# Patient Record
Sex: FEMALE | Race: WHITE | Employment: STUDENT | URBAN - METROPOLITAN AREA
[De-identification: names, ages, dates, MRNs, and addresses within clinical notes are randomized per-mention and may not be internally consistent; named-entity substitution may affect disease eponyms.]

---
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
## 2022-10-18 ENCOUNTER — APPOINTMENT (OUTPATIENT)
Dept: CT IMAGING | Age: 22
DRG: 815 | End: 2022-10-18
Attending: EMERGENCY MEDICINE
Payer: COMMERCIAL

## 2022-10-18 ENCOUNTER — HOSPITAL ENCOUNTER (INPATIENT)
Age: 22
LOS: 2 days | Discharge: HOME | DRG: 815 | End: 2022-10-20
Attending: STUDENT IN AN ORGANIZED HEALTH CARE EDUCATION/TRAINING PROGRAM | Admitting: HOSPITALIST
Payer: COMMERCIAL

## 2022-10-18 DIAGNOSIS — D73.5 SPLENIC INFARCT: Primary | ICD-10-CM

## 2022-10-18 DIAGNOSIS — R10.12 ABDOMINAL PAIN, LUQ (LEFT UPPER QUADRANT): ICD-10-CM

## 2022-10-18 DIAGNOSIS — R79.89 ELEVATED LFTS: ICD-10-CM

## 2022-10-18 DIAGNOSIS — R61 NIGHT SWEATS: ICD-10-CM

## 2022-10-18 DIAGNOSIS — D73.5 SPLENIC INFARCTION: ICD-10-CM

## 2022-10-18 LAB
ALBUMIN SERPL-MCNC: 3.2 G/DL (ref 3.5–5)
ALBUMIN/GLOB SERPL: 0.7 {RATIO} (ref 1.1–2.2)
ALP SERPL-CCNC: 140 U/L (ref 45–117)
ALT SERPL-CCNC: 100 U/L (ref 12–78)
ANION GAP SERPL CALC-SCNC: 8 MMOL/L (ref 5–15)
APPEARANCE UR: CLEAR
AST SERPL-CCNC: 109 U/L (ref 15–37)
BACTERIA URNS QL MICRO: NEGATIVE /HPF
BASOPHILS # BLD: 0.1 K/UL (ref 0–0.1)
BASOPHILS NFR BLD: 1 % (ref 0–1)
BILIRUB SERPL-MCNC: 1.5 MG/DL (ref 0.2–1)
BILIRUB UR QL CFM: NEGATIVE
BUN SERPL-MCNC: 4 MG/DL (ref 6–20)
BUN/CREAT SERPL: 5 (ref 12–20)
CALCIUM SERPL-MCNC: 9.1 MG/DL (ref 8.5–10.1)
CHLORIDE SERPL-SCNC: 105 MMOL/L (ref 97–108)
CO2 SERPL-SCNC: 23 MMOL/L (ref 21–32)
COLOR UR: ABNORMAL
COMMENT, HOLDF: NORMAL
COVID-19 RAPID TEST, COVR: NOT DETECTED
CREAT SERPL-MCNC: 0.78 MG/DL (ref 0.55–1.02)
DIFFERENTIAL METHOD BLD: ABNORMAL
EOSINOPHIL # BLD: 0 K/UL (ref 0–0.4)
EOSINOPHIL NFR BLD: 0 % (ref 0–7)
EPITH CASTS URNS QL MICRO: ABNORMAL /LPF
ERYTHROCYTE [DISTWIDTH] IN BLOOD BY AUTOMATED COUNT: 13.6 % (ref 11.5–14.5)
GLOBULIN SER CALC-MCNC: 4.6 G/DL (ref 2–4)
GLUCOSE SERPL-MCNC: 118 MG/DL (ref 65–100)
GLUCOSE UR STRIP.AUTO-MCNC: NEGATIVE MG/DL
HCG UR QL: NEGATIVE
HCT VFR BLD AUTO: 34.6 % (ref 35–47)
HGB BLD-MCNC: 12.6 G/DL (ref 11.5–16)
HGB UR QL STRIP: NEGATIVE
HYALINE CASTS URNS QL MICRO: ABNORMAL /LPF (ref 0–5)
IMM GRANULOCYTES # BLD AUTO: 0.1 K/UL (ref 0–0.04)
IMM GRANULOCYTES NFR BLD AUTO: 1 % (ref 0–0.5)
KETONES UR QL STRIP.AUTO: NEGATIVE MG/DL
LEUKOCYTE ESTERASE UR QL STRIP.AUTO: ABNORMAL
LIPASE SERPL-CCNC: 78 U/L (ref 73–393)
LYMPHOCYTES # BLD: 5 K/UL (ref 0.8–3.5)
LYMPHOCYTES NFR BLD: 54 % (ref 12–49)
MCH RBC QN AUTO: 35 PG (ref 26–34)
MCHC RBC AUTO-ENTMCNC: 36.4 G/DL (ref 30–36.5)
MCV RBC AUTO: 96.1 FL (ref 80–99)
MONOCYTES # BLD: 0.6 K/UL (ref 0–1)
MONOCYTES NFR BLD: 6 % (ref 5–13)
NEUTS SEG # BLD: 3.5 K/UL (ref 1.8–8)
NEUTS SEG NFR BLD: 38 % (ref 32–75)
NITRITE UR QL STRIP.AUTO: POSITIVE
NRBC # BLD: 0 K/UL (ref 0–0.01)
NRBC BLD-RTO: 0 PER 100 WBC
PH UR STRIP: 6 [PH] (ref 5–8)
PLATELET # BLD AUTO: 234 K/UL (ref 150–400)
PMV BLD AUTO: 11.9 FL (ref 8.9–12.9)
POTASSIUM SERPL-SCNC: 3.7 MMOL/L (ref 3.5–5.1)
PROT SERPL-MCNC: 7.8 G/DL (ref 6.4–8.2)
PROT UR STRIP-MCNC: NEGATIVE MG/DL
RBC # BLD AUTO: 3.6 M/UL (ref 3.8–5.2)
RBC #/AREA URNS HPF: ABNORMAL /HPF (ref 0–5)
RBC MORPH BLD: ABNORMAL
SAMPLES BEING HELD,HOLD: NORMAL
SODIUM SERPL-SCNC: 136 MMOL/L (ref 136–145)
SOURCE, COVRS: NORMAL
SP GR UR REFRACTOMETRY: 1.02 (ref 1–1.03)
UR CULT HOLD, URHOLD: NORMAL
UROBILINOGEN UR QL STRIP.AUTO: 1 EU/DL (ref 0.2–1)
WBC # BLD AUTO: 9.3 K/UL (ref 3.6–11)
WBC MORPH BLD: ABNORMAL
WBC URNS QL MICRO: ABNORMAL /HPF (ref 0–4)

## 2022-10-18 PROCEDURE — 85613 RUSSELL VIPER VENOM DILUTED: CPT

## 2022-10-18 PROCEDURE — 99285 EMERGENCY DEPT VISIT HI MDM: CPT

## 2022-10-18 PROCEDURE — 74177 CT ABD & PELVIS W/CONTRAST: CPT

## 2022-10-18 PROCEDURE — 86777 TOXOPLASMA ANTIBODY: CPT

## 2022-10-18 PROCEDURE — 74011250637 HC RX REV CODE- 250/637: Performed by: HOSPITALIST

## 2022-10-18 PROCEDURE — 74011250636 HC RX REV CODE- 250/636: Performed by: INTERNAL MEDICINE

## 2022-10-18 PROCEDURE — 96374 THER/PROPH/DIAG INJ IV PUSH: CPT

## 2022-10-18 PROCEDURE — 85025 COMPLETE CBC W/AUTO DIFF WBC: CPT

## 2022-10-18 PROCEDURE — 83690 ASSAY OF LIPASE: CPT

## 2022-10-18 PROCEDURE — 74011250636 HC RX REV CODE- 250/636: Performed by: HOSPITALIST

## 2022-10-18 PROCEDURE — 65270000029 HC RM PRIVATE

## 2022-10-18 PROCEDURE — 81001 URINALYSIS AUTO W/SCOPE: CPT

## 2022-10-18 PROCEDURE — 80053 COMPREHEN METABOLIC PANEL: CPT

## 2022-10-18 PROCEDURE — 81241 F5 GENE: CPT

## 2022-10-18 PROCEDURE — 96375 TX/PRO/DX INJ NEW DRUG ADDON: CPT

## 2022-10-18 PROCEDURE — 74011250637 HC RX REV CODE- 250/637: Performed by: INTERNAL MEDICINE

## 2022-10-18 PROCEDURE — 81025 URINE PREGNANCY TEST: CPT

## 2022-10-18 PROCEDURE — 87040 BLOOD CULTURE FOR BACTERIA: CPT

## 2022-10-18 PROCEDURE — 74011000250 HC RX REV CODE- 250: Performed by: HOSPITALIST

## 2022-10-18 PROCEDURE — 87635 SARS-COV-2 COVID-19 AMP PRB: CPT

## 2022-10-18 PROCEDURE — 36415 COLL VENOUS BLD VENIPUNCTURE: CPT

## 2022-10-18 PROCEDURE — 74011000636 HC RX REV CODE- 636: Performed by: RADIOLOGY

## 2022-10-18 PROCEDURE — 74011250636 HC RX REV CODE- 250/636: Performed by: EMERGENCY MEDICINE

## 2022-10-18 RX ORDER — POLYETHYLENE GLYCOL 3350 17 G/17G
17 POWDER, FOR SOLUTION ORAL DAILY PRN
Status: DISCONTINUED | OUTPATIENT
Start: 2022-10-18 | End: 2022-10-20 | Stop reason: HOSPADM

## 2022-10-18 RX ORDER — ACETAMINOPHEN 325 MG/1
650 TABLET ORAL
Status: DISCONTINUED | OUTPATIENT
Start: 2022-10-18 | End: 2022-10-20 | Stop reason: HOSPADM

## 2022-10-18 RX ORDER — ENOXAPARIN SODIUM 100 MG/ML
90 INJECTION SUBCUTANEOUS EVERY 12 HOURS
Status: DISCONTINUED | OUTPATIENT
Start: 2022-10-18 | End: 2022-10-20 | Stop reason: HOSPADM

## 2022-10-18 RX ORDER — SODIUM CHLORIDE 0.9 % (FLUSH) 0.9 %
5-40 SYRINGE (ML) INJECTION EVERY 8 HOURS
Status: DISCONTINUED | OUTPATIENT
Start: 2022-10-18 | End: 2022-10-20 | Stop reason: HOSPADM

## 2022-10-18 RX ORDER — ONDANSETRON 2 MG/ML
4 INJECTION INTRAMUSCULAR; INTRAVENOUS
Status: DISCONTINUED | OUTPATIENT
Start: 2022-10-18 | End: 2022-10-20 | Stop reason: HOSPADM

## 2022-10-18 RX ORDER — ACETAMINOPHEN 650 MG/1
650 SUPPOSITORY RECTAL
Status: DISCONTINUED | OUTPATIENT
Start: 2022-10-18 | End: 2022-10-20 | Stop reason: HOSPADM

## 2022-10-18 RX ORDER — MORPHINE SULFATE 2 MG/ML
2 INJECTION, SOLUTION INTRAMUSCULAR; INTRAVENOUS ONCE
Status: COMPLETED | OUTPATIENT
Start: 2022-10-18 | End: 2022-10-18

## 2022-10-18 RX ORDER — KETOROLAC TROMETHAMINE 30 MG/ML
30 INJECTION, SOLUTION INTRAMUSCULAR; INTRAVENOUS
Status: DISCONTINUED | OUTPATIENT
Start: 2022-10-18 | End: 2022-10-20 | Stop reason: HOSPADM

## 2022-10-18 RX ORDER — TRAMADOL HYDROCHLORIDE 50 MG/1
50 TABLET ORAL
Status: DISCONTINUED | OUTPATIENT
Start: 2022-10-18 | End: 2022-10-20 | Stop reason: HOSPADM

## 2022-10-18 RX ORDER — MORPHINE SULFATE 2 MG/ML
2 INJECTION, SOLUTION INTRAMUSCULAR; INTRAVENOUS
Status: COMPLETED | OUTPATIENT
Start: 2022-10-18 | End: 2022-10-18

## 2022-10-18 RX ORDER — SODIUM CHLORIDE 0.9 % (FLUSH) 0.9 %
5-40 SYRINGE (ML) INJECTION AS NEEDED
Status: DISCONTINUED | OUTPATIENT
Start: 2022-10-18 | End: 2022-10-20 | Stop reason: HOSPADM

## 2022-10-18 RX ORDER — ONDANSETRON 2 MG/ML
4 INJECTION INTRAMUSCULAR; INTRAVENOUS
Status: COMPLETED | OUTPATIENT
Start: 2022-10-18 | End: 2022-10-18

## 2022-10-18 RX ORDER — DIPHENHYDRAMINE HCL 25 MG
25 CAPSULE ORAL
Status: DISCONTINUED | OUTPATIENT
Start: 2022-10-18 | End: 2022-10-20 | Stop reason: HOSPADM

## 2022-10-18 RX ORDER — ONDANSETRON 4 MG/1
4 TABLET, ORALLY DISINTEGRATING ORAL
Status: DISCONTINUED | OUTPATIENT
Start: 2022-10-18 | End: 2022-10-20 | Stop reason: HOSPADM

## 2022-10-18 RX ADMIN — CEFEPIME 2 G: 2 INJECTION, POWDER, FOR SOLUTION INTRAVENOUS at 15:57

## 2022-10-18 RX ADMIN — ENOXAPARIN SODIUM 90 MG: 100 INJECTION SUBCUTANEOUS at 17:41

## 2022-10-18 RX ADMIN — ONDANSETRON 4 MG: 2 INJECTION INTRAMUSCULAR; INTRAVENOUS at 12:37

## 2022-10-18 RX ADMIN — IOPAMIDOL 100 ML: 755 INJECTION, SOLUTION INTRAVENOUS at 13:21

## 2022-10-18 RX ADMIN — MORPHINE SULFATE 2 MG: 2 INJECTION, SOLUTION INTRAMUSCULAR; INTRAVENOUS at 21:04

## 2022-10-18 RX ADMIN — SODIUM CHLORIDE, PRESERVATIVE FREE 10 ML: 5 INJECTION INTRAVENOUS at 17:43

## 2022-10-18 RX ADMIN — SODIUM CHLORIDE, PRESERVATIVE FREE 10 ML: 5 INJECTION INTRAVENOUS at 21:05

## 2022-10-18 RX ADMIN — DIPHENHYDRAMINE HYDROCHLORIDE 25 MG: 25 CAPSULE ORAL at 19:27

## 2022-10-18 RX ADMIN — TRAMADOL HYDROCHLORIDE 50 MG: 50 TABLET, COATED ORAL at 22:54

## 2022-10-18 RX ADMIN — MORPHINE SULFATE 2 MG: 2 INJECTION, SOLUTION INTRAMUSCULAR; INTRAVENOUS at 12:37

## 2022-10-18 RX ADMIN — VANCOMYCIN HYDROCHLORIDE 2250 MG: 10 INJECTION, POWDER, LYOPHILIZED, FOR SOLUTION INTRAVENOUS at 17:42

## 2022-10-18 RX ADMIN — KETOROLAC TROMETHAMINE 30 MG: 30 INJECTION, SOLUTION INTRAMUSCULAR; INTRAVENOUS at 15:58

## 2022-10-18 RX ADMIN — TRAMADOL HYDROCHLORIDE 50 MG: 50 TABLET, COATED ORAL at 15:57

## 2022-10-18 NOTE — ED NOTES
Has a final transfer review been performed?  Yes    Reason for Admission: spleenic infaract  Patient comes from: ER  Mental Status: Alert and oriented  ADL:self care  Ambulation:no difficulty  Pertinent Info/Safety Concerns: none  COVID Status: Negative  MEWS Score: 0     Vitals:    10/18/22 1039 10/18/22 1632   BP: (!) 137/93    Pulse: 97    Resp: 16    Temp: 97.2 °F (36.2 °C)    SpO2: 100%    Weight:  94.8 kg (209 lb)   Height:  5' 11\" (1.803 m)     Lines:   Peripheral IV 10/18/22 Left Antecubital (Active)   Site Assessment Clean, dry, & intact 10/18/22 1052   Phlebitis Assessment 0 10/18/22 1052   Infiltration Assessment 0 10/18/22 1052   Dressing Status Clean, dry, & intact 10/18/22 1052   Hub Color/Line Status Pink 10/18/22 1052   Action Taken Blood drawn 10/18/22 1052      Transport mode:Wheelchair    Kt Hayden  being transferred to [facility](unit) for routine progression of care     \"Notification of philippefer note given to [facility]

## 2022-10-18 NOTE — ED TRIAGE NOTES
Pt c/o upper abd pain, mostly left upper, x one week, +fever of 102, denies urianry symptoms, denies pregnancy

## 2022-10-18 NOTE — H&P
[address] [facility] Adult  Hospitalist Group  History and Physical    Date of Service:  10/18/2022  Primary Care Provider: Clarissa Gagnon MD  Source of information: The patient    Chief Complaint: Abdominal Pain      History of Presenting Illness:   Jacy Jovel is a 25 y.o. female who presents with abdominal pain and fever     Patient is a 26-year-old otherwise healthy female presenting to ED for evaluation of intermittent abdominal pain with onset October 7, has been becoming more consistent and more severe over the past 3 days. Also reports intermittent nightly fevers up to 102 since pain started. Feels pain across her upper abdomen, feels most severe pain in the left upper abdomen. Pain worsens after eating. Feels better when she leans forward. She was in the waiting room at her PCP's office today for an evaluation when her pain worsened and they recommended her to come to the ER today. Has had nausea when the pain intensifies. Denies cough, congestion, vomiting, diarrhea, bloody stool, dysuria, hematuria, or any additional medical complaints at this time. Denied IV drug use. Pushpa Dumont was tested covid in the past, but all negative. denied leg swelling, no leg pain. No recent travel. She is a nursing student. REVIEW OF SYSTEMS:  General: HPI, no significant changes of weight or appetite  HEENT: no headache, no vision changes, no nose discharge, no hearing changes  no sore throat. RES: no wheezing, no cough, no sob  CVS: no cp, no palpitation. Muscular: no joint swelling, no muscle pain, no leg swelling  Skin: no rash, no itching   GI: HPI. : no dysuria, no hematuria  Hemo: no gum bleeding, no petechial   Neuro: no sensation changes, no focal weakness   Endo: no polydipsia   Psych: denied depression      No past medical history on file. Denied congenital heart disease. NO SICKLE CELL   No past surgical history on file. No surgical history .    Prior to Admission medications    Not on File   Not taking any meds at home, not taking OCPs   No Known Allergies   No family history on file. Denied dvt/pe HISTORY IN FAMILY. Social History:   DENIED SMOKING, DENIED ivda. Family and social history were personally reviewed, all pertinent and relevant details are outlined as above. Objective:   Visit Vitals  BP (!) 137/93   Pulse 97   Temp 97.2 °F (36.2 °C)   Resp 16   SpO2 100%      O2 Device: None (Room air)    PHYSICAL EXAM:   General: Alert x oriented x 3, awake, no acute distress   HEENT: PEERL, EOMI, moist mucus membranes  Neck: Supple, no JVD, no meningeal signs  Chest: Clear to auscultation bilaterally   CVS: RRR, S1 S2 heard, no murmurs/rubs/gallops  Abd: Soft, LUq mild mod tenderness. , non-distended, +bowel sounds   Ext: No clubbing, no cyanosis, no edema  Neuro/Psych: Pleasant mood and affect, CN 2-12 grossly intact, sensory grossly within normal limit, Strength 5/5 in all extremities, DTR 1+ x 4  Cap refill: Brisk, less than 3 seconds  Pulses: 2+, symmetric in all extremities  Skin: Warm, dry, without rashes or lesions    Data Review: All diagnostic labs and studies have been reviewed. Abnormal Labs Reviewed   URINALYSIS W/MICROSCOPIC - Abnormal; Notable for the following components:       Result Value    Nitrites Positive (*)     Leukocyte Esterase TRACE (*)     All other components within normal limits   CBC WITH AUTOMATED DIFF - Abnormal; Notable for the following components:    RBC 3.60 (*)     HCT 34.6 (*)     MCH 35.0 (*)     LYMPHOCYTES 54 (*)     IMMATURE GRANULOCYTES 1 (*)     ABS. LYMPHOCYTES 5.0 (*)     ABS. IMM. GRANS. 0.1 (*)     All other components within normal limits   METABOLIC PANEL, COMPREHENSIVE - Abnormal; Notable for the following components:    Glucose 118 (*)     BUN 4 (*)     BUN/Creatinine ratio 5 (*)     Bilirubin, total 1.5 (*)     ALT (SGPT) 100 (*)     AST (SGOT) 109 (*)     Alk.  phosphatase 140 (*)     Albumin 3.2 (*)     Globulin 4.6 (*) A-G Ratio 0.7 (*)     All other components within normal limits       All Micro Results       Procedure Component Value Units Date/Time    CULTURE, BLOOD, PAIRED [[id number]] Collected: 10/18/22 1602    Order Status: Sent Specimen: Blood Updated: 10/18/22 1608    COVID-19 RAPID TEST [[id number]]     Order Status: Sent     URINE CULTURE HOLD SAMPLE [[id number]] Collected: 10/18/22 1139    Order Status: Completed Specimen: Urine from Serum Updated: 10/18/22 1145     Urine culture hold       Urine on hold in Microbiology dept for 2 days. If unpreserved urine is submitted, it cannot be used for addtional testing after 24 hours, recollection will be required. IMAGING:   CT ABD PELV W CONT   Final Result   Large wedge-shaped hypoattenuating area in the lower pole of the spleen is   consistent with infarct, age uncertain, favor acute to subacute. Consider   short-term interval follow-up with CT or ultrasound to assess for abscess   formation. ECG/ECHO:  No results found for this or any previous visit. Assessment:   Given the patient's current clinical presentation, there is a high level of concern for decompensation if discharged from the emergency department. Complex decision making was performed, which includes reviewing the patient's available past medical records, laboratory results, and imaging studies. 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 Start lovenox for ac. Checking lupus anticoagulant panel. And factor V leiden, consult hematologist. Raudel Ducking if having underlying malignancy such as lymphoma. Echo with bubble study to r/o shunt. Pain control. Fever: may due to splenic infarction. Should r/o endocarditis, septic emboli to sleep. Check covid, blood culture, echo to r/o vegetation. Empirically iv abx with vanco and cefepime. Check covid.          DIET: ADULT DIET Regular   ISOLATION PRECAUTIONS: There are currently no Active Isolations  CODE STATUS: Full Code   DVT PROPHYLAXIS: Lovenox  FUNCTIONAL STATUS PRIOR TO HOSPITALIZATION: Fully active and ambulatory; able to carry on all self-care without restriction. Ambulatory status/function: By self   EARLY MOBILITY ASSESSMENT: Recommend a consult to the Mobility Team  ANTICIPATED DISCHARGE: Greater than 48 hours. ANTICIPATED DISPOSITION: Home  EMERGENCY CONTACT/SURROGATE DECISION MAKER: pt makes her own decision     CRITICAL CARE WAS PERFORMED FOR THIS ENCOUNTER: NO.      Signed By: Frederick Shipley MD     October 18, 2022         Please note that this dictation may have been completed with Carlo Shnergle, the computer voice recognition software. Quite often unanticipated grammatical, syntax, homophones, and other interpretive errors are inadvertently transcribed by the computer software. Please disregard these errors. Please excuse any errors that have escaped final proofreading.

## 2022-10-18 NOTE — PROGRESS NOTES
Pharmacist Note - Vancomycin Dosing    Consult provided for this 25 y.o. female for indication of empiric for fever. Antibiotic regimen(s): Vanc + Cefepime  Patient on vancomycin PTA? NO     Recent Labs     10/18/22  1051   WBC 9.3   CREA 0.78   BUN 4*     Frequency of BMP: daily x 3  Height: 180.3 cm  Weight: 94.8 kg  Est CrCl: >120 ml/min; UO: -- ml/kg/hr  Temp (24hrs), Av °F (36.7 °C), Min:97.2 °F (36.2 °C), Max:98.5 °F (36.9 °C)    Cultures:  10/18 blood - pending    MRSA Swab ordered (if applicable)? NO    The plan below is expected to result in a target range of AUC/-600    Therapy will be initiated with a loading dose of 2250 mg IV x 1 to be followed by a maintenance dose of 1250 mg IV every 12 hours. Pharmacy to follow patient daily and order levels / make dose adjustments as appropriate. *Vancomycin has been dosed used Bayesian kinetics software to target an AUC/TY of 400-600, which provides adequate exposure for an assumed infection due to MRSA with an TY of 1 or less while reducing the risk of nephrotoxicity as seen with traditional trough based dosing goals.

## 2022-10-18 NOTE — ED PROVIDER NOTES
Please note that this dictation was completed with [employer], the computer voice recognition software.  Quite often unanticipated grammatical, syntax, homophones, and other interpretive errors are inadvertently transcribed by the computer software.  Please disregard these errors.  Please excuse any errors that have escaped final proofreading. Patient is a 26-year-old otherwise healthy female presenting to ED for evaluation of intermittent abdominal pain with onset October 7, has been becoming more consistent and more severe over the past 3 days. Also reports intermittent nightly fevers up to 102 since pain started. Feels pain across her upper abdomen, feels most severe pain in the left upper abdomen. Pain worsens after eating. Feels better when she leans forward. She was in the waiting room at her PCP's office today for an evaluation when her pain worsened and they recommended her to come to the ER today. Has had nausea when the pain intensifies. Denies cough, congestion, vomiting, diarrhea, bloody stool, dysuria, hematuria, or any additional medical complaints at this time. No past medical history on file. No past surgical history on file. No family history on file.     Social History     Socioeconomic History    Marital status: SINGLE     Spouse name: Not on file    Number of children: Not on file    Years of education: Not on file    Highest education level: Not on file   Occupational History    Not on file   Tobacco Use    Smoking status: Not on file    Smokeless tobacco: Not on file   Substance and Sexual Activity    Alcohol use: Not on file    Drug use: Not on file    Sexual activity: Not on file   Other Topics Concern    Not on file   Social History Narrative    Not on file     Social Determinants of Health     Financial Resource Strain: Not on file   Food Insecurity: Not on file   Transportation Needs: Not on file   Physical Activity: Not on file   Stress: Not on file Social Connections: Not on file   Intimate Partner Violence: Not on file   Housing Stability: Not on file         ALLERGIES: Patient has no allergy information on record. Review of Systems   Constitutional:  Positive for fever. Negative for chills. HENT:  Negative for congestion, ear pain and sore throat. Eyes:  Negative for visual disturbance. Respiratory:  Negative for cough and shortness of breath. Cardiovascular:  Negative for chest pain. Gastrointestinal:  Positive for abdominal pain and nausea. Negative for diarrhea and vomiting. Genitourinary:  Negative for dysuria and flank pain. Musculoskeletal:  Negative for back pain. Skin:  Negative for color change. Neurological:  Negative for dizziness and headaches. Psychiatric/Behavioral:  Negative for confusion. Vitals:    10/18/22 1039   BP: (!) 137/93   Pulse: 97   Resp: 16   Temp: 97.2 °F (36.2 °C)   SpO2: 100%            Physical Exam  Vitals and nursing note reviewed. Constitutional:       General: She is not in acute distress. Appearance: Normal appearance. She is not ill-appearing. HENT:      Head: Normocephalic and atraumatic. Eyes:      General: Vision grossly intact. Extraocular Movements: Extraocular movements intact. Conjunctiva/sclera: Conjunctivae normal.   Neck:      Trachea: Phonation normal.   Cardiovascular:      Rate and Rhythm: Normal rate and regular rhythm. Heart sounds: Normal heart sounds. Pulmonary:      Effort: Pulmonary effort is normal.      Breath sounds: Normal breath sounds and air entry. Abdominal:      Palpations: Abdomen is soft. Tenderness: There is abdominal tenderness in the right upper quadrant, epigastric area and left upper quadrant. There is guarding. Musculoskeletal:         General: Normal range of motion. Skin:     General: Skin is warm and dry. Neurological:      General: No focal deficit present.       Mental Status: She is alert and oriented to person, place, and time. Psychiatric:         Behavior: Behavior normal.        MDM  Number of Diagnoses or Management Options  Abdominal pain, LUQ (left upper quadrant)  Elevated LFTs  Night sweats  Splenic infarct  Diagnosis management comments: Patient is alert, afebrile, vital stable. Appears uncomfortable. Presents with 11 days of intermittent upper abdominal pain, becoming more consistent and severe with nightly fevers and sweats. Tender across her upper abdomen, most severe pain in the left upper quadrant. Labs with mildly elevated LFTs and bilirubin. CT scan showing an acute splenic infarct and enlarged periaortic lymphnodes. Needs further work-up and likely heme-onc evaluation, will admit to hospitalist.       Amount and/or Complexity of Data Reviewed  Discuss the patient with other providers: yes (Discussed patient with ED attending Tori [address], DO who agrees with current management plan. )      ED Course as of 10/18/22 1427   Tue Oct 18, 0953   8007 METABOLIC PANEL, COMPREHENSIVE(!):    Sodium 136   Potassium 3.7   Chloride 105   CO2 23   Anion gap 8   Glucose 118(!)   BUN 4(!)   Creatinine 0.78   BUN/Creatinine ratio 5(!)   eGFR >60   Calcium 9.1   Bilirubin, total 1.5(!)   (!)   (!)   Alk. phosphatase 140(!)   Protein, total 7.8   Albumin 3.2(!)   Globulin 4.6(!)   A-G Ratio 0.7(!) [EP]   1420 CT ABD PELV W CONT  IMPRESSION  Large wedge-shaped hypoattenuating area in the lower pole of the spleen is  consistent with infarct, age uncertain, favor acute to subacute. Consider  short-term interval follow-up with CT or ultrasound to assess for abscess  formation. [EP]      ED Course User Index  [EP] OMAIRA Leon     Perfect Serve Consult for Admission  2:21 PM    ED Room Number: R40/R40  Patient Name and age:  Serena Garcia 25 y.o.  female  Working Diagnosis:   1. Splenic infarct    2. Elevated LFTs    3. Night sweats    4.  Abdominal pain, LUQ (left upper quadrant) COVID-19 Suspicion:  no  Sepsis present:  no  Reassessment needed: no  Code Status:  Full Code  Readmission: no  Isolation Requirements:  no  Recommended Level of Care:  med/surg  Department:[facility] Adult ED - 21   Other:  24 yo otherwise healthy F with LUQ abdominal pain and night sweats/fevers since October 7th. Splenic infarct on CT with unknown cause. Needs further work-up.     Procedures

## 2022-10-19 LAB
ANION GAP SERPL CALC-SCNC: 6 MMOL/L (ref 5–15)
BUN SERPL-MCNC: 5 MG/DL (ref 6–20)
BUN/CREAT SERPL: 7 (ref 12–20)
CALCIUM SERPL-MCNC: 9 MG/DL (ref 8.5–10.1)
CHLORIDE SERPL-SCNC: 104 MMOL/L (ref 97–108)
CO2 SERPL-SCNC: 24 MMOL/L (ref 21–32)
CREAT SERPL-MCNC: 0.71 MG/DL (ref 0.55–1.02)
DRVVT MIX, 117894: 43.4 SEC (ref 0–40.4)
DRVVT RATIO 500585: 1.2 RATIO (ref 0.8–1.2)
GLUCOSE SERPL-MCNC: 99 MG/DL (ref 65–100)
INTERPRETATION, 117893: ABNORMAL
POTASSIUM SERPL-SCNC: 4 MMOL/L (ref 3.5–5.1)
SCREEN APTT: 41.5 SEC (ref 0–51.9)
SCREEN DRVVT: 49.4 SEC (ref 0–47)
SODIUM SERPL-SCNC: 134 MMOL/L (ref 136–145)

## 2022-10-19 PROCEDURE — 74011000250 HC RX REV CODE- 250: Performed by: HOSPITALIST

## 2022-10-19 PROCEDURE — 74011000258 HC RX REV CODE- 258: Performed by: HOSPITALIST

## 2022-10-19 PROCEDURE — 80048 BASIC METABOLIC PNL TOTAL CA: CPT

## 2022-10-19 PROCEDURE — 74011250636 HC RX REV CODE- 250/636: Performed by: INTERNAL MEDICINE

## 2022-10-19 PROCEDURE — 65270000029 HC RM PRIVATE

## 2022-10-19 PROCEDURE — 74011250636 HC RX REV CODE- 250/636: Performed by: HOSPITALIST

## 2022-10-19 PROCEDURE — 36415 COLL VENOUS BLD VENIPUNCTURE: CPT

## 2022-10-19 PROCEDURE — 74011250637 HC RX REV CODE- 250/637: Performed by: HOSPITALIST

## 2022-10-19 RX ORDER — PROCHLORPERAZINE EDISYLATE 5 MG/ML
5 INJECTION INTRAMUSCULAR; INTRAVENOUS
Status: DISCONTINUED | OUTPATIENT
Start: 2022-10-19 | End: 2022-10-20 | Stop reason: HOSPADM

## 2022-10-19 RX ORDER — MORPHINE SULFATE 2 MG/ML
2 INJECTION, SOLUTION INTRAMUSCULAR; INTRAVENOUS
Status: DISCONTINUED | OUTPATIENT
Start: 2022-10-19 | End: 2022-10-20 | Stop reason: HOSPADM

## 2022-10-19 RX ORDER — OXYCODONE HYDROCHLORIDE 5 MG/1
10 TABLET ORAL
Status: DISCONTINUED | OUTPATIENT
Start: 2022-10-19 | End: 2022-10-20 | Stop reason: HOSPADM

## 2022-10-19 RX ORDER — AMOXICILLIN 250 MG
1 CAPSULE ORAL
Status: DISCONTINUED | OUTPATIENT
Start: 2022-10-19 | End: 2022-10-20 | Stop reason: HOSPADM

## 2022-10-19 RX ORDER — PANTOPRAZOLE SODIUM 40 MG/1
40 TABLET, DELAYED RELEASE ORAL
Status: DISCONTINUED | OUTPATIENT
Start: 2022-10-19 | End: 2022-10-20 | Stop reason: HOSPADM

## 2022-10-19 RX ORDER — OXYCODONE HYDROCHLORIDE 5 MG/1
5 TABLET ORAL
Status: DISCONTINUED | OUTPATIENT
Start: 2022-10-19 | End: 2022-10-20 | Stop reason: HOSPADM

## 2022-10-19 RX ORDER — SODIUM CHLORIDE 9 MG/ML
75 INJECTION, SOLUTION INTRAVENOUS CONTINUOUS
Status: DISPENSED | OUTPATIENT
Start: 2022-10-19 | End: 2022-10-20

## 2022-10-19 RX ADMIN — ACETAMINOPHEN 650 MG: 325 TABLET ORAL at 03:18

## 2022-10-19 RX ADMIN — TRAMADOL HYDROCHLORIDE 50 MG: 50 TABLET, COATED ORAL at 05:04

## 2022-10-19 RX ADMIN — SODIUM CHLORIDE, PRESERVATIVE FREE 10 ML: 5 INJECTION INTRAVENOUS at 06:04

## 2022-10-19 RX ADMIN — CEFEPIME 2 G: 2 INJECTION, POWDER, FOR SOLUTION INTRAVENOUS at 16:42

## 2022-10-19 RX ADMIN — KETOROLAC TROMETHAMINE 30 MG: 30 INJECTION, SOLUTION INTRAMUSCULAR; INTRAVENOUS at 07:57

## 2022-10-19 RX ADMIN — PROCHLORPERAZINE EDISYLATE 5 MG: 5 INJECTION INTRAMUSCULAR; INTRAVENOUS at 16:43

## 2022-10-19 RX ADMIN — OXYCODONE 10 MG: 5 TABLET ORAL at 09:42

## 2022-10-19 RX ADMIN — ENOXAPARIN SODIUM 90 MG: 100 INJECTION SUBCUTANEOUS at 05:50

## 2022-10-19 RX ADMIN — CEFEPIME 2 G: 2 INJECTION, POWDER, FOR SOLUTION INTRAVENOUS at 03:56

## 2022-10-19 RX ADMIN — KETOROLAC TROMETHAMINE 30 MG: 30 INJECTION, SOLUTION INTRAMUSCULAR; INTRAVENOUS at 15:41

## 2022-10-19 RX ADMIN — SODIUM CHLORIDE, PRESERVATIVE FREE 10 ML: 5 INJECTION INTRAVENOUS at 21:30

## 2022-10-19 RX ADMIN — ONDANSETRON 4 MG: 2 INJECTION INTRAMUSCULAR; INTRAVENOUS at 03:41

## 2022-10-19 RX ADMIN — SODIUM CHLORIDE 75 ML/HR: 9 INJECTION, SOLUTION INTRAVENOUS at 09:42

## 2022-10-19 RX ADMIN — SODIUM CHLORIDE, PRESERVATIVE FREE 10 ML: 5 INJECTION INTRAVENOUS at 14:00

## 2022-10-19 RX ADMIN — ONDANSETRON 4 MG: 2 INJECTION INTRAMUSCULAR; INTRAVENOUS at 11:37

## 2022-10-19 RX ADMIN — PANTOPRAZOLE SODIUM 40 MG: 40 TABLET, DELAYED RELEASE ORAL at 09:42

## 2022-10-19 RX ADMIN — MORPHINE SULFATE 2 MG: 2 INJECTION, SOLUTION INTRAMUSCULAR; INTRAVENOUS at 06:16

## 2022-10-19 RX ADMIN — ENOXAPARIN SODIUM 90 MG: 100 INJECTION SUBCUTANEOUS at 16:42

## 2022-10-19 RX ADMIN — KETOROLAC TROMETHAMINE 30 MG: 30 INJECTION, SOLUTION INTRAMUSCULAR; INTRAVENOUS at 00:30

## 2022-10-19 RX ADMIN — ONDANSETRON 4 MG: 2 INJECTION INTRAMUSCULAR; INTRAVENOUS at 21:30

## 2022-10-19 NOTE — PROGRESS NOTES
Bedside shift change report given to Srikanth Jennings RN (oncoming nurse) by Kasey Aguirre RN (offgoing nurse). Report included the following information SBAR.

## 2022-10-19 NOTE — PROGRESS NOTES
[address] [facility] Adult  Hospitalist Group                                                                                          Hospitalist Progress Note  Marti Segura MD  Answering service: [phone] from in house phone        Date of Service:  10/19/2022  NAME:  Chris Young  :  2000  MRN:  [id number]      Admission Summary:   Patient is a 43-year-old otherwise healthy female presenting to ED for evaluation of intermittent abdominal pain with onset , has been becoming more consistent and more severe over the past 3 days. Also reports intermittent nightly fevers up to 102 since pain started. Feels pain across her upper abdomen, feels most severe pain in the left upper abdomen. Pain worsens after eating. Feels better when she leans forward. She was in the waiting room at her PCP's office today for an evaluation when her pain worsened and they recommended her to come to the ER today. Has had nausea when the pain intensifies. Denies cough, congestion, vomiting, diarrhea, bloody stool, dysuria, hematuria, or any additional medical complaints at this time. Denied IV drug use. Osman Valdez was tested covid in the past, but all negative. Interval history / Subjective:   Severe left upper quadrant pain      Assessment & Plan:     Large acute splenic infarction: etiology unclear. Start lovenox for ac. Checking lupus anticoagulant panel. And factor V leiden pending , consult hematologist. Olga Harrington if having underlying malignancy such as lymphoma. Echo with bubble study to r/o shunt. Pain control. Fever: may due to splenic infarction. May be none specific, blood culture so far neg. Will dc vanco. May dc cefepime if culture neg > 48 hours. Epnding echo to r/o endocarditis, septic emboli. Covid neg. Elevated LFT: normal ball bladder, LFT in CT. Follow LFT, check acute hepatitis panel.  Limit tylenol           Code status: full   Prophylaxis: lovenox   Care Plan discussed with: pt, pt parents. Anticipated Disposition: home in 1-2 days if culture negative. Hospital Problems  Never Reviewed            Codes Class Noted POA    Splenic infarction ICD-10-CM: D73.5  ICD-9-CM: 289.59  10/18/2022 Unknown             Review of Systems:   A comprehensive review of systems was negative except for that written in the HPI. Vital Signs:    Last 24hrs VS reviewed since prior progress note. Most recent are:  Visit Vitals  /80 (BP 1 Location: Left upper arm, BP Patient Position: At rest)   Pulse 77   Temp 97.9 °F (36.6 °C)   Resp 16   Ht 5' 11\" (1.803 m)   Wt 94.8 kg (209 lb)   SpO2 97%   BMI 29.15 kg/m²         Intake/Output Summary (Last 24 hours) at 10/19/2022 1341  Last data filed at 10/19/2022 0430  Gross per 24 hour   Intake --   Output 200 ml   Net -200 ml        Physical Examination:     I had a face to face encounter with this patient and independently examined them on 10/19/2022 as outlined below:          Constitutional:  No acute distress, cooperative, pleasant    ENT:  Oral mucosa moist, oropharynx benign. Resp:  CTA bilaterally. No wheezing/rhonchi/rales. No accessory muscle use. CV:  Regular rhythm, normal rate, no murmurs, gallops, rubs    GI:  Soft, non distended, non tender. normoactive bowel sounds, no hepatosplenomegaly     Musculoskeletal:  No edema, warm, 2+ pulses throughout    Neurologic:  Moves all extremities. AAOx3, CN II-XII reviewed            Data Review:    Review and/or order of clinical lab test      Labs:     Recent Labs     10/18/22  1051   WBC 9.3   HGB 12.6   HCT 34.6*        Recent Labs     10/19/22  0407 10/18/22  1051   * 136   K 4.0 3.7    105   CO2 24 23   BUN 5* 4*   CREA 0.71 0.78   GLU 99 118*   CA 9.0 9.1     Recent Labs     10/18/22  1051   *   *   TBILI 1.5*   TP 7.8   ALB 3.2*   GLOB 4.6*   LPSE 78     No results for input(s): INR, PTP, APTT, INREXT in the last 72 hours.    No results for input(s): FE, TIBC, PSAT, FERR in the last 72 hours. No results found for: FOL, RBCF   No results for input(s): PH, PCO2, PO2 in the last 72 hours. No results for input(s): CPK, CKNDX, TROIQ in the last 72 hours.     No lab exists for component: CPKMB  No results found for: CHOL, CHOLX, CHLST, CHOLV, HDL, HDLP, LDL, LDLC, DLDLP, TGLX, TRIGL, TRIGP, CHHD, CHHDX  No results found for: [facility]  Lab Results   Component Value Date/Time    Color DARK YELLOW 10/18/2022 11:39 AM    Appearance CLEAR 10/18/2022 11:39 AM    Specific gravity 1.017 10/18/2022 11:39 AM    pH (UA) 6.0 10/18/2022 11:39 AM    Protein Negative 10/18/2022 11:39 AM    Glucose Negative 10/18/2022 11:39 AM    Ketone Negative 10/18/2022 11:39 AM    Urobilinogen 1.0 10/18/2022 11:39 AM    Nitrites Positive (A) 10/18/2022 11:39 AM    Leukocyte Esterase TRACE (A) 10/18/2022 11:39 AM    Epithelial cells FEW 10/18/2022 11:39 AM    Bacteria Negative 10/18/2022 11:39 AM    WBC 0-4 10/18/2022 11:39 AM    RBC 0-5 10/18/2022 11:39 AM         Medications Reviewed:     Current Facility-Administered Medications   Medication Dose Route Frequency    morphine injection 2 mg  2 mg IntraVENous Q4H PRN    oxyCODONE IR (ROXICODONE) tablet 5 mg  5 mg Oral Q4H PRN    oxyCODONE IR (ROXICODONE) tablet 10 mg  10 mg Oral Q4H PRN    0.9% sodium chloride infusion  75 mL/hr IntraVENous CONTINUOUS    pantoprazole (PROTONIX) tablet 40 mg  40 mg Oral ACB    senna-docusate (PERICOLACE) 8.6-50 mg per tablet 1 Tablet  1 Tablet Oral DAILY PRN    sodium chloride (NS) flush 5-40 mL  5-40 mL IntraVENous Q8H    sodium chloride (NS) flush 5-40 mL  5-40 mL IntraVENous PRN    acetaminophen (TYLENOL) tablet 650 mg  650 mg Oral Q6H PRN    Or    acetaminophen (TYLENOL) suppository 650 mg  650 mg Rectal Q6H PRN    polyethylene glycol (MIRALAX) packet 17 g  17 g Oral DAILY PRN    ondansetron (ZOFRAN ODT) tablet 4 mg  4 mg Oral Q8H PRN    Or    ondansetron (ZOFRAN) injection 4 mg  4 mg IntraVENous Q6H PRN ketorolac (TORADOL) injection 30 mg  30 mg IntraVENous Q6H PRN    traMADoL (ULTRAM) tablet 50 mg  50 mg Oral Q6H PRN    cefepime (MAXIPIME) 2 g in 0.9% sodium chloride (MBP/ADV) 100 mL MBP  2 g IntraVENous Q12H    enoxaparin (LOVENOX) injection 90 mg  90 mg SubCUTAneous Q12H    diphenhydrAMINE (BENADRYL) capsule 25 mg  25 mg Oral Q6H PRN     ______________________________________________________________________  EXPECTED LENGTH OF STAY: 2d 4h  ACTUAL LENGTH OF STAY:          1                 Ariel Moreno MD

## 2022-10-20 ENCOUNTER — APPOINTMENT (OUTPATIENT)
Dept: NON INVASIVE DIAGNOSTICS | Age: 22
DRG: 815 | End: 2022-10-20
Attending: HOSPITALIST
Payer: COMMERCIAL

## 2022-10-20 VITALS
SYSTOLIC BLOOD PRESSURE: 122 MMHG | HEART RATE: 93 BPM | WEIGHT: 209 LBS | RESPIRATION RATE: 16 BRPM | BODY MASS INDEX: 29.26 KG/M2 | OXYGEN SATURATION: 99 % | DIASTOLIC BLOOD PRESSURE: 76 MMHG | HEIGHT: 71 IN | TEMPERATURE: 98.2 F

## 2022-10-20 LAB
ALBUMIN SERPL-MCNC: 2.6 G/DL (ref 3.5–5)
ALBUMIN/GLOB SERPL: 0.6 {RATIO} (ref 1.1–2.2)
ALP SERPL-CCNC: 150 U/L (ref 45–117)
ALT SERPL-CCNC: 88 U/L (ref 12–78)
ANION GAP SERPL CALC-SCNC: 6 MMOL/L (ref 5–15)
ANION GAP SERPL CALC-SCNC: 7 MMOL/L (ref 5–15)
AST SERPL-CCNC: 91 U/L (ref 15–37)
BASOPHILS # BLD: 0.1 K/UL (ref 0–0.1)
BASOPHILS NFR BLD: 1 % (ref 0–1)
BILIRUB SERPL-MCNC: 2.5 MG/DL (ref 0.2–1)
BUN SERPL-MCNC: 5 MG/DL (ref 6–20)
BUN SERPL-MCNC: 6 MG/DL (ref 6–20)
BUN/CREAT SERPL: 7 (ref 12–20)
BUN/CREAT SERPL: 8 (ref 12–20)
CALCIUM SERPL-MCNC: 8.8 MG/DL (ref 8.5–10.1)
CALCIUM SERPL-MCNC: 8.9 MG/DL (ref 8.5–10.1)
CHLORIDE SERPL-SCNC: 105 MMOL/L (ref 97–108)
CHLORIDE SERPL-SCNC: 106 MMOL/L (ref 97–108)
CO2 SERPL-SCNC: 26 MMOL/L (ref 21–32)
CO2 SERPL-SCNC: 26 MMOL/L (ref 21–32)
CREAT SERPL-MCNC: 0.7 MG/DL (ref 0.55–1.02)
CREAT SERPL-MCNC: 0.73 MG/DL (ref 0.55–1.02)
DIFFERENTIAL METHOD BLD: ABNORMAL
ECHO AV AREA PEAK VELOCITY: 3 CM2
ECHO AV AREA/BSA PEAK VELOCITY: 1.4 CM2/M2
ECHO AV PEAK GRADIENT: 6 MMHG
ECHO AV PEAK VELOCITY: 1.3 M/S
ECHO AV VELOCITY RATIO: 0.85
ECHO LA DIAMETER INDEX: 1.58 CM/M2
ECHO LA DIAMETER: 3.4 CM
ECHO LA VOL 2C: 55 ML (ref 22–52)
ECHO LA VOL 4C: 41 ML (ref 22–52)
ECHO LA VOLUME AREA LENGTH: 51 ML
ECHO LA VOLUME INDEX A2C: 26 ML/M2 (ref 16–34)
ECHO LA VOLUME INDEX A4C: 19 ML/M2 (ref 16–34)
ECHO LA VOLUME INDEX AREA LENGTH: 24 ML/M2 (ref 16–34)
ECHO LV E' LATERAL VELOCITY: 19 CM/S
ECHO LV E' SEPTAL VELOCITY: 13 CM/S
ECHO LV FRACTIONAL SHORTENING: 35 % (ref 28–44)
ECHO LV INTERNAL DIMENSION DIASTOLE INDEX: 2.14 CM/M2
ECHO LV INTERNAL DIMENSION DIASTOLIC: 4.6 CM (ref 3.9–5.3)
ECHO LV INTERNAL DIMENSION SYSTOLIC INDEX: 1.4 CM/M2
ECHO LV INTERNAL DIMENSION SYSTOLIC: 3 CM
ECHO LV IVSD: 1.1 CM (ref 0.6–0.9)
ECHO LV MASS 2D: 192.9 G (ref 67–162)
ECHO LV MASS INDEX 2D: 89.7 G/M2 (ref 43–95)
ECHO LV POSTERIOR WALL DIASTOLIC: 1.2 CM (ref 0.6–0.9)
ECHO LV RELATIVE WALL THICKNESS RATIO: 0.52
ECHO LVOT AREA: 3.5 CM2
ECHO LVOT DIAM: 2.1 CM
ECHO LVOT PEAK GRADIENT: 5 MMHG
ECHO LVOT PEAK VELOCITY: 1.1 M/S
ECHO MV A VELOCITY: 0.56 M/S
ECHO MV AREA PHT: 4.4 CM2
ECHO MV E DECELERATION TIME (DT): 172.9 MS
ECHO MV E VELOCITY: 0.95 M/S
ECHO MV E/A RATIO: 1.7
ECHO MV E/E' LATERAL: 5
ECHO MV E/E' RATIO (AVERAGED): 6.15
ECHO MV E/E' SEPTAL: 7.31
ECHO MV PRESSURE HALF TIME (PHT): 50.1 MS
ECHO PV MAX VELOCITY: 0.9 M/S
ECHO PV PEAK GRADIENT: 3 MMHG
ECHO RV FREE WALL PEAK S': 14 CM/S
ECHO RV INTERNAL DIMENSION: 2.3 CM
ECHO RV TAPSE: 2 CM (ref 1.7–?)
ECHO TV REGURGITANT MAX VELOCITY: 2.45 M/S
ECHO TV REGURGITANT PEAK GRADIENT: 24 MMHG
EOSINOPHIL # BLD: 0 K/UL (ref 0–0.4)
EOSINOPHIL NFR BLD: 0 % (ref 0–7)
ERYTHROCYTE [DISTWIDTH] IN BLOOD BY AUTOMATED COUNT: 14.7 % (ref 11.5–14.5)
GLOBULIN SER CALC-MCNC: 4.2 G/DL (ref 2–4)
GLUCOSE SERPL-MCNC: 128 MG/DL (ref 65–100)
GLUCOSE SERPL-MCNC: 82 MG/DL (ref 65–100)
HAV IGM SER QL: NONREACTIVE
HBV CORE IGM SER QL: NONREACTIVE
HBV SURFACE AG SER QL: <0.1 INDEX
HBV SURFACE AG SER QL: NEGATIVE
HCT VFR BLD AUTO: 31.4 % (ref 35–47)
HCV AB SERPL QL IA: NONREACTIVE
HGB BLD-MCNC: 11 G/DL (ref 11.5–16)
IMM GRANULOCYTES # BLD AUTO: 0 K/UL
IMM GRANULOCYTES NFR BLD AUTO: 0 %
LYMPHOCYTES # BLD: 5.7 K/UL (ref 0.8–3.5)
LYMPHOCYTES NFR BLD: 68 % (ref 12–49)
MAGNESIUM SERPL-MCNC: 2.3 MG/DL (ref 1.6–2.4)
MCH RBC QN AUTO: 34.9 PG (ref 26–34)
MCHC RBC AUTO-ENTMCNC: 35 G/DL (ref 30–36.5)
MCV RBC AUTO: 99.7 FL (ref 80–99)
MONOCYTES # BLD: 0.5 K/UL (ref 0–1)
MONOCYTES NFR BLD: 6 % (ref 5–13)
NEUTS SEG # BLD: 2.1 K/UL (ref 1.8–8)
NEUTS SEG NFR BLD: 25 % (ref 32–75)
NRBC # BLD: 0 K/UL (ref 0–0.01)
NRBC BLD-RTO: 0 PER 100 WBC
PHOSPHATE SERPL-MCNC: 2.1 MG/DL (ref 2.6–4.7)
PLATELET # BLD AUTO: 192 K/UL (ref 150–400)
PMV BLD AUTO: 11.9 FL (ref 8.9–12.9)
POTASSIUM SERPL-SCNC: 3.7 MMOL/L (ref 3.5–5.1)
POTASSIUM SERPL-SCNC: 3.9 MMOL/L (ref 3.5–5.1)
PROT SERPL-MCNC: 6.8 G/DL (ref 6.4–8.2)
RBC # BLD AUTO: 3.15 M/UL (ref 3.8–5.2)
RBC MORPH BLD: ABNORMAL
RBC MORPH BLD: ABNORMAL
SODIUM SERPL-SCNC: 137 MMOL/L (ref 136–145)
SODIUM SERPL-SCNC: 139 MMOL/L (ref 136–145)
SP1: NORMAL
SP2: NORMAL
SP3: NORMAL
WBC # BLD AUTO: 8.4 K/UL (ref 3.6–11)
WBC MORPH BLD: ABNORMAL

## 2022-10-20 PROCEDURE — 93306 TTE W/DOPPLER COMPLETE: CPT

## 2022-10-20 PROCEDURE — 81240 F2 GENE: CPT

## 2022-10-20 PROCEDURE — 81270 JAK2 GENE: CPT

## 2022-10-20 PROCEDURE — 80074 ACUTE HEPATITIS PANEL: CPT

## 2022-10-20 PROCEDURE — 80053 COMPREHEN METABOLIC PANEL: CPT

## 2022-10-20 PROCEDURE — 86147 CARDIOLIPIN ANTIBODY EA IG: CPT

## 2022-10-20 PROCEDURE — 81279 JAK2 GENE TRGT SEQUENCE ALYS: CPT

## 2022-10-20 PROCEDURE — 85305 CLOT INHIBIT PROT S TOTAL: CPT

## 2022-10-20 PROCEDURE — 85025 COMPLETE CBC W/AUTO DIFF WBC: CPT

## 2022-10-20 PROCEDURE — 74011250636 HC RX REV CODE- 250/636: Performed by: HOSPITALIST

## 2022-10-20 PROCEDURE — 85302 CLOT INHIBIT PROT C ANTIGEN: CPT

## 2022-10-20 PROCEDURE — 85300 ANTITHROMBIN III ACTIVITY: CPT

## 2022-10-20 PROCEDURE — 83020 HEMOGLOBIN ELECTROPHORESIS: CPT

## 2022-10-20 PROCEDURE — 74011000258 HC RX REV CODE- 258: Performed by: HOSPITALIST

## 2022-10-20 PROCEDURE — 81219 CALR GENE COM VARIANTS: CPT

## 2022-10-20 PROCEDURE — 74011250637 HC RX REV CODE- 250/637: Performed by: HOSPITALIST

## 2022-10-20 PROCEDURE — 84100 ASSAY OF PHOSPHORUS: CPT

## 2022-10-20 PROCEDURE — 36415 COLL VENOUS BLD VENIPUNCTURE: CPT

## 2022-10-20 PROCEDURE — 83735 ASSAY OF MAGNESIUM: CPT

## 2022-10-20 PROCEDURE — 85303 CLOT INHIBIT PROT C ACTIVITY: CPT

## 2022-10-20 PROCEDURE — 74011000250 HC RX REV CODE- 250: Performed by: HOSPITALIST

## 2022-10-20 RX ORDER — TRAMADOL HYDROCHLORIDE 50 MG/1
50 TABLET ORAL
Qty: 10 TABLET | Refills: 0 | Status: SHIPPED | OUTPATIENT
Start: 2022-10-20 | End: 2022-10-23

## 2022-10-20 RX ORDER — ASPIRIN 81 MG/1
81 TABLET ORAL DAILY
Qty: 30 TABLET | Refills: 0 | Status: SHIPPED | OUTPATIENT
Start: 2022-10-20 | End: 2022-11-19

## 2022-10-20 RX ORDER — ONDANSETRON 4 MG/1
4 TABLET, FILM COATED ORAL
Qty: 9 TABLET | Refills: 0 | Status: SHIPPED | OUTPATIENT
Start: 2022-10-20 | End: 2022-10-23

## 2022-10-20 RX ORDER — TRAMADOL HYDROCHLORIDE 50 MG/1
50 TABLET ORAL
Qty: 10 TABLET | Refills: 0 | Status: SHIPPED | OUTPATIENT
Start: 2022-10-20 | End: 2022-10-20 | Stop reason: SDUPTHER

## 2022-10-20 RX ADMIN — SODIUM CHLORIDE, PRESERVATIVE FREE 10 ML: 5 INJECTION INTRAVENOUS at 14:00

## 2022-10-20 RX ADMIN — PROCHLORPERAZINE EDISYLATE 5 MG: 5 INJECTION INTRAMUSCULAR; INTRAVENOUS at 01:24

## 2022-10-20 RX ADMIN — PROCHLORPERAZINE EDISYLATE 5 MG: 5 INJECTION INTRAMUSCULAR; INTRAVENOUS at 16:56

## 2022-10-20 RX ADMIN — OXYCODONE 10 MG: 5 TABLET ORAL at 09:44

## 2022-10-20 RX ADMIN — PANTOPRAZOLE SODIUM 40 MG: 40 TABLET, DELAYED RELEASE ORAL at 06:53

## 2022-10-20 RX ADMIN — PROCHLORPERAZINE EDISYLATE 5 MG: 5 INJECTION INTRAMUSCULAR; INTRAVENOUS at 10:56

## 2022-10-20 RX ADMIN — OXYCODONE 10 MG: 5 TABLET ORAL at 01:23

## 2022-10-20 RX ADMIN — KETOROLAC TROMETHAMINE 30 MG: 30 INJECTION, SOLUTION INTRAMUSCULAR; INTRAVENOUS at 13:08

## 2022-10-20 RX ADMIN — CEFEPIME 2 G: 2 INJECTION, POWDER, FOR SOLUTION INTRAVENOUS at 04:24

## 2022-10-20 RX ADMIN — OXYCODONE 10 MG: 5 TABLET ORAL at 15:15

## 2022-10-20 RX ADMIN — SODIUM CHLORIDE, PRESERVATIVE FREE 10 ML: 5 INJECTION INTRAVENOUS at 06:00

## 2022-10-20 RX ADMIN — ENOXAPARIN SODIUM 90 MG: 100 INJECTION SUBCUTANEOUS at 04:25

## 2022-10-20 NOTE — CONSULTS
Atsp pt regarding splenic infarct  Pt examined, chart reviewed   D/w family  Consult dictated  Marvin Chavez MD

## 2022-10-20 NOTE — PROGRESS NOTES
Problem: Falls - Risk of  Goal: *Absence of Falls  Description: Document Vernia Colder Fall Risk and appropriate interventions in the flowsheet.   Outcome: Progressing Towards Goal  Note: Fall Risk Interventions:            Medication Interventions: Evaluate medications/consider consulting pharmacy

## 2022-10-20 NOTE — PROGRESS NOTES
[address] 34 October 20, 2022       RE: Rima Victoria      To Whom It May Concern,    This is to certify that Rima Victoria  was in the hospital since 10/18/2022 may return to work on 11/03/2022. Please feel free to contact my office if you have any questions or concerns. Thank you for your assistance in this matter.       Sincerely,      Kasie Ahumada MD

## 2022-10-20 NOTE — DISCHARGE INSTRUCTIONS
You have been evaluated and treated for abdominal pain and splenic infarct. Blood work positive for EBV/mononucleosis. You have been evaluated by hematologist and ID. You should follow up with hematologist in 1-2 weeks for further evaluation of possible hypercoagulable state. You should take ASA 81 mg PO daily. You should avoid sports, contact sport and heavy lifting for at least 4 weeks.

## 2022-10-20 NOTE — PROGRESS NOTES
[address] [facility] Adult  Hospitalist Group                                                                                          Hospitalist Progress Note  Mayra Steele MD  Answering service: [phone] from in house phone        Date of Service:  10/20/2022  NAME:  Bhakti Floyd  :  2000  MRN:  [id number]      Admission Summary:   Patient is a 78-year-old otherwise healthy female presenting to ED for evaluation of intermittent abdominal pain with onset , has been becoming more consistent and more severe over the past 3 days. Also reports intermittent nightly fevers up to 102 since pain started. Feels pain across her upper abdomen, feels most severe pain in the left upper abdomen. Pain worsens after eating. Feels better when she leans forward. She was in the waiting room at her PCP's office today for an evaluation when her pain worsened and they recommended her to come to the ER today. Has had nausea when the pain intensifies. Denies cough, congestion, vomiting, diarrhea, bloody stool, dysuria, hematuria, or any additional medical complaints at this time. Denied IV drug use. Marii Fitzpatrick was tested covid in the past, but all negative. Interval history / Subjective:   NAD, no acute event over night  Severe left upper quadrant pain, improved  Able to tolerate PO  Hematology and Id consulted     Assessment & Plan:     Large acute splenic infarction: etiology unclear. Lovenox Tx for ac.   lupus anticoagulant panel. And factor V Leiden, slight elevated,   Hematologist consulted, appreciated recommendations   Echo with bubble study to r/o shunt. Pain control. Possible related to EBV, ? Coagulopathy: ? Lupus,   ECHO no vegetations    Fever: may due to splenic infarction. May be none specific,   BC NGTD  Possible related to EBV, CMV  ID consulted, will follow up recommendations  May dc cefepime if culture neg > 48 hours.    ECHO completed, no endocarditis, Covid neg. Elevated LFT:   normal gall bladder,   Follow LFT, slow trending down  acute hepatitis panel negative   Limit tylenol           Code status: full   Prophylaxis: lovenox   Care Plan discussed with: pt, pt parents. Anticipated Disposition: home in 1-2 days if culture negative. Pending ID evaluation     Hospital Problems  Never Reviewed            Codes Class Noted POA    Splenic infarction ICD-10-CM: D73.5  ICD-9-CM: 289.59  10/18/2022 Unknown           Review of Systems:   A comprehensive review of systems was negative except for that written in the HPI. Vital Signs:    Last 24hrs VS reviewed since prior progress note. Most recent are:  Visit Vitals  /76   Pulse 93   Temp 98.2 °F (36.8 °C)   Resp 16   Ht 5' 11\" (1.803 m)   Wt 94.8 kg (208 lb 15.9 oz)   SpO2 99%   BMI 29.15 kg/m²       No intake or output data in the 24 hours ending 10/20/22 1429       Physical Examination:     I had a face to face encounter with this patient and independently examined them on 10/20/2022 as outlined below:          Constitutional:  No acute distress, cooperative, pleasant    ENT:  Oral mucosa moist, oropharynx benign. Resp:  CTA bilaterally. No wheezing/rhonchi/rales. No accessory muscle use. CV:  Regular rhythm, normal rate, no murmurs, gallops, rubs    GI:  Soft, non distended, slight tender LUQ, normoactive bowel sounds, no hepatosplenomegaly     Musculoskeletal:  No edema, warm, 2+ pulses throughout    Neurologic:  Moves all extremities. AAOx3, CN II-XII reviewed            Data Review:    Review and/or order of clinical lab test    ECHO:    Left Ventricle: Normal left ventricular systolic function with a visually estimated EF of 60 - 65%. Left ventricle size is normal. Normal wall thickness. Normal wall motion. Normal diastolic function. No valvular vegetations. Negative bubble study with no evidence of intracardiac shunt.   Labs:     Recent Labs     10/20/22  0055 10/18/22  1051   WBC 8.4 9.3   HGB 11.0* 12.6   HCT 31.4* 34.6*    234       Recent Labs     10/20/22  1157 10/20/22  0055 10/19/22  0407    139 134*   K 3.7 3.9 4.0    106 104   CO2 26 26 24   BUN 6 5* 5*   CREA 0.73 0.70 0.71   * 82 99   CA 8.8 8.9 9.0   MG  --  2.3  --    PHOS  --  2.1*  --        Recent Labs     10/20/22  1157 10/18/22  1051   ALT 88* 100*   * 140*   TBILI 2.5* 1.5*   TP 6.8 7.8   ALB 2.6* 3.2*   GLOB 4.2* 4.6*   LPSE  --  78       No results for input(s): INR, PTP, APTT, INREXT, INREXT in the last 72 hours. No results for input(s): FE, TIBC, PSAT, FERR in the last 72 hours. No results found for: FOL, RBCF   No results for input(s): PH, PCO2, PO2 in the last 72 hours. No results for input(s): CPK, CKNDX, TROIQ in the last 72 hours.     No lab exists for component: CPKMB  No results found for: CHOL, CHOLX, CHLST, CHOLV, HDL, HDLP, LDL, LDLC, DLDLP, TGLX, TRIGL, TRIGP, CHHD, CHHDX  No results found for: [facility]  Lab Results   Component Value Date/Time    Color DARK YELLOW 10/18/2022 11:39 AM    Appearance CLEAR 10/18/2022 11:39 AM    Specific gravity 1.017 10/18/2022 11:39 AM    pH (UA) 6.0 10/18/2022 11:39 AM    Protein Negative 10/18/2022 11:39 AM    Glucose Negative 10/18/2022 11:39 AM    Ketone Negative 10/18/2022 11:39 AM    Urobilinogen 1.0 10/18/2022 11:39 AM    Nitrites Positive (A) 10/18/2022 11:39 AM    Leukocyte Esterase TRACE (A) 10/18/2022 11:39 AM    Epithelial cells FEW 10/18/2022 11:39 AM    Bacteria Negative 10/18/2022 11:39 AM    WBC 0-4 10/18/2022 11:39 AM    RBC 0-5 10/18/2022 11:39 AM         Medications Reviewed:     Current Facility-Administered Medications   Medication Dose Route Frequency    morphine injection 2 mg  2 mg IntraVENous Q4H PRN    oxyCODONE IR (ROXICODONE) tablet 5 mg  5 mg Oral Q4H PRN    oxyCODONE IR (ROXICODONE) tablet 10 mg  10 mg Oral Q4H PRN    pantoprazole (PROTONIX) tablet 40 mg  40 mg Oral ACB    senna-docusate (PERICOLACE) 8.6-50 mg per tablet 1 Tablet  1 Tablet Oral DAILY PRN    prochlorperazine (COMPAZINE) injection 5 mg  5 mg IntraVENous Q6H PRN    sodium chloride (NS) flush 5-40 mL  5-40 mL IntraVENous Q8H    sodium chloride (NS) flush 5-40 mL  5-40 mL IntraVENous PRN    acetaminophen (TYLENOL) tablet 650 mg  650 mg Oral Q6H PRN    Or    acetaminophen (TYLENOL) suppository 650 mg  650 mg Rectal Q6H PRN    polyethylene glycol (MIRALAX) packet 17 g  17 g Oral DAILY PRN    ondansetron (ZOFRAN ODT) tablet 4 mg  4 mg Oral Q8H PRN    Or    ondansetron (ZOFRAN) injection 4 mg  4 mg IntraVENous Q6H PRN    ketorolac (TORADOL) injection 30 mg  30 mg IntraVENous Q6H PRN    traMADoL (ULTRAM) tablet 50 mg  50 mg Oral Q6H PRN    cefepime (MAXIPIME) 2 g in 0.9% sodium chloride (MBP/ADV) 100 mL MBP  2 g IntraVENous Q12H    enoxaparin (LOVENOX) injection 90 mg  90 mg SubCUTAneous Q12H    diphenhydrAMINE (BENADRYL) capsule 25 mg  25 mg Oral Q6H PRN     ______________________________________________________________________  EXPECTED LENGTH OF STAY: 2d 4h  ACTUAL LENGTH OF STAY:          2                 Jean Claude Gonzalez MD

## 2022-10-20 NOTE — PROGRESS NOTES
Transition Of Care: The patient plans to discharge home with parents to transport when stable for discharge. No CM needs at this time. RUR:  7%    The patient is from home and lives with . Infectious disease, hematology, hospitalist following. The patient plans to discharge home. Care Management Interventions  PCP Verified by CM: Yes  Palliative Care Criteria Met (RRAT>21 & CHF Dx)?: No  Mode of Transport at Discharge: Other (see comment)  Transition of Care Consult (CM Consult): Discharge Planning  MyChart Signup: Yes  Discharge Durable Medical Equipment: No  Health Maintenance Reviewed: Yes  Physical Therapy Consult: No  Occupational Therapy Consult: No  Speech Therapy Consult: No  Support Systems: Parent(s)  Confirm Follow Up Transport: Family  The Plan for Transition of Care is Related to the Following Treatment Goals : The patient plans to discharge home. [place] Resource Information Provided?: No  Discharge Location  Patient Expects to be Discharged to[de-identified] Home     Reason for Admission:  Splenic infarction                     RUR Score:   7%                    Plan for utilizing home health:  No indications at this time. PCP: First and Last name:  Desiree Guerrero MD     Name of Practice:    Are you a current patient: Yes/No: Y   Approximate date of last visit: Summer, 2022   Can you participate in a virtual visit with your PCP: Y                    Current Advanced Directive/Advance Care Plan: Full Code      Healthcare Decision Maker:   Click here to complete [address] including selection of the Healthcare Decision Maker Relationship (ie \"Primary\")           Father: Cally Ly: [phone]                  Transition of Care Plan:                       CM met with the patient and father in room 566. The patient is independent with ADL's, IADL's, drives a vehicle and uses [facility].  The patient lives in an apartment in [place] but home address is listed with parents and under their insurance. The patient plans to discharge home with parents to transport when stable for discharge. No CM needs at this time.      Amy Lynn RN/CRM

## 2022-10-20 NOTE — CONSULTS
[address]    Name:  Twin Crook  MR#:  [id number]  :  2000  ACCOUNT #:  [de-identified]  DATE OF SERVICE:  10/20/2022      HISTORY OF PRESENT ILLNESS:  The patient is a 72-year-old woman with recently identified splenic infarct, who is seen for initial hematology evaluation regarding this problem. This patient has an approximate 1-week history of worsening abdominal discomfort which crescendoed and brought her to the emergency room with that complaint. In the ER, she had an ultrasound and then CT scan of the abdomen performed which showed a large wedge-shaped abnormality in this plane consistent with infarct. She did have nightly intermittent temperature elevations to 102 over the few days prior to admission. She did have nausea yesterday. She also had the flu and the COVID vaccine within the past 3 weeks. PAST MEDICAL HISTORY:  Unremarkable. PAST SURGICAL HISTORY:  Negative. ALLERGIES:  NO KNOWN DRUG ALLERGIES. FAMILY HISTORY:  Negative for hematologic dyscrasias or clotting disorders. SOCIAL HISTORY:  She does not smoke. She is single. She drinks alcohol rarely. REVIEW OF SYSTEMS:  As noted above, otherwise noncontributory. PHYSICAL EXAMINATION:  GENERAL:  She is a pleasant, well-developed, well-nourished woman in no apparent distress. VITAL SIGNS:  Temperature 98, pulse 93, /76, respirations 16, O2 sat 99% on room air. HEENT:  EOMI. Nonicteric sclerae. NECK:  Supple. She has no lymphadenopathy noted. LUNGS:  Clear. CARDIAC:  Regular rate and rhythm. No murmur. ABDOMEN:  Soft, nontender. Vague discomfort in the left upper quadrant. No hepatosplenomegaly noted. EXTREMITIES:  No edema. NEUROPSYCHIATRIC:  Grossly intact. IMPRESSION:  Wedge-shaped abnormality in the spleen consistent with splenic infarct in a woman who had several days of fever and abdominal pain leading up to this imaging.   This patient also had her COVID booster approximately 3 weeks ago and a flu shot as well. Laboratories performed thus far demonstrate a normal CBC, a normal Chem panel with the exception of elevated AST, ALT, and total bilirubin. She had a positive IgG Eduardo-Barr virus titer, but IgM is normal.  Her lupus anticoagulant workup shows a borderline elevated dilute Oniel venom viper titer. The cause for the infarct is not obvious to me, it may be that she does have a lupus anticoagulant, however, this diagnosis needs to be confirmed with followup testing in 2 months. The workup for this should also include evaluation for bacterial infection including endocarditis. We also advised hypercoagulable workup which is more extensive and has been ordered already and we will take care of that. At discharge, low-dose aspirin 81 mg daily would seem reasonable until the workup is complete and followup lupus anticoagulant testing is performed. In the meantime, continuing Lovenox q.12h. is reasonable, continuing antibiotics is reasonable. We will follow with you on behalf of [employer].       Kate Mukherjee MD      [id number]  D:  10/20/2022 11:31  T:  10/20/2022 14:24  JOB #:  [id number]

## 2022-10-20 NOTE — DISCHARGE SUMMARY
Physician Discharge Summary     Patient ID:    Noram Doshi  [id number]  [unfilled]  2000    Admit date: 10/18/2022    Discharge date and time: 10/20/2022    Hospital Diagnoses and Treatment Rendered:   Patient is a 59-year-old otherwise healthy female presenting to ED for evaluation of intermittent abdominal pain with onset October 7, has been becoming more consistent and more severe over the past 3 days. Also reports intermittent nightly fevers up to 102 since pain started. Feels pain across her upper abdomen, feels most severe pain in the left upper abdomen. Pain worsens after eating. Feels better when she leans forward. She was in the waiting room at her PCP's office today for an evaluation when her pain worsened and they recommended her to come to the ER today. Has had nausea when the pain intensifies. Denies cough, congestion, vomiting, diarrhea, bloody stool, dysuria, hematuria, or any additional medical complaints at this time. Denied IV drug use. Thaddeus Chávez was tested covid in the past, but all negative. The patient was admitted to medicine, CT abdomen was positive for splenic infarct, etiology was unclear. Lovenox treatment dose was initiated, lupus anticoagulant panel was obtained. Cefepime was initiated, blood cultures were negative for growth. Viral panel was positive for EBV. Echocardiogram was within normal limits, bubble study was negative. Hematologist and infectious disease specialist were consulted. Per hematologist the patient might have hyper coagulable state but is further needs to be evaluated as outpatient. Per hematologist was recommended to continue aspirin 81 milligrams p.o. daily as outpatient. ID was consulted, per infectious disease specialist condition of the patient most likely was related to EBV, antibiotics were discontinued and the patient was cleared for discharge by ID.   On current medical management condition of patient improved, pain was controlled with Toradol, oxycodone, antiemetics were provided, nausea resolved, patient was able to tolerate p.o., fever resolved. Rest of hospital stay patient remained hemodynamically stable, afebrile, was able to tolerate p.o. diet and ambulate and she expressed wish to be discharged home. The patient will follow-up with hematologist in 1 to 2 weeks for further evaluation of possible hyper coagulable state. The patient was advised physical activity including sport, or heavy lifting. Excuse from work was given for 2 weeks. Large acute splenic infarction: etiology unclear. Lovenox Tx for ac.   lupus anticoagulant panel. And factor V Leiden, slight elevated,   Hematologist consulted, appreciated recommendations   Echo with bubble study to r/o shunt. Pain control. Possible related to EBV, ? Coagulopathy: ? Lupus,   ECHO no vegetations     Fever: may due to splenic infarction. May be none specific,   BC NGTD  Possible related to EBV, CMV  ID consulted, will follow up recommendations  May dc cefepime if culture neg > 48 hours. ECHO completed, no endocarditis,    Covid neg. Elevated LFT:   normal gall bladder,   Follow LFT, slow trending down  acute hepatitis panel negative   Limit tylenol         Chronic Diagnoses:    Problem List as of 10/20/2022 Never Reviewed            Codes Class Noted - Resolved    Splenic infarction ICD-10-CM: D73.5  ICD-9-CM: 289.59  10/18/2022 - Present           Discharge Medications:   Current Discharge Medication List        START taking these medications    Details   traMADoL (ULTRAM) 50 mg tablet Take 1 Tablet by mouth every six (6) hours as needed for Pain for up to 3 days. Max Daily Amount: 200 mg. Hold for sedation.   Do not drive a car if you take Tramadol  Qty: 10 Tablet, Refills: 0  Start date: 10/20/2022, End date: 10/23/2022    Associated Diagnoses: Splenic infarction      aspirin delayed-release 81 mg tablet Take 1 Tablet by mouth daily for 30 days. Qty: 30 Tablet, Refills: 0  Start date: 10/20/2022, End date: 11/19/2022      ondansetron hcl (Zofran) 4 mg tablet Take 1 Tablet by mouth every eight (8) hours as needed for Nausea or Vomiting for up to 3 days. Qty: 9 Tablet, Refills: 0  Start date: 10/20/2022, End date: 10/23/2022               Follow up Care:    1. Preethi Sarah MD in 1-2 weeks. Please call to set up an appointment shortly after discharge. Diet:  Regular Diet    Disposition:  Home. Advanced Directive:   FULL    DNR      Discharge Exam:  See today's note. CONSULTATIONS: Hematology/Oncology, ID    Significant Diagnostic Studies:   10/18/2022: BUN 4 MG/DL (L; Ref range: 6 - 20 MG/DL); Calcium 9.1 MG/DL (Ref range: 8.5 - 10.1 MG/DL); CO2 23 mmol/L (Ref range: 21 - 32 mmol/L); Creatinine 0.78 MG/DL (Ref range: 0.55 - 1.02 MG/DL); Glucose 118 mg/dL (H; Ref range: 65 - 100 mg/dL); HCT 34.6 % (L; Ref range: 35.0 - 47.0 %); HGB 12.6 g/dL (Ref range: 11.5 - 16.0 g/dL); Potassium 3.7 mmol/L (Ref range: 3.5 - 5.1 mmol/L); Sodium 136 mmol/L (Ref range: 136 - 145 mmol/L)  10/19/2022: BUN 5 MG/DL (L; Ref range: 6 - 20 MG/DL); Calcium 9.0 MG/DL (Ref range: 8.5 - 10.1 MG/DL); CO2 24 mmol/L (Ref range: 21 - 32 mmol/L); Creatinine 0.71 MG/DL (Ref range: 0.55 - 1.02 MG/DL); Glucose 99 mg/dL (Ref range: 65 - 100 mg/dL); Potassium 4.0 mmol/L (Ref range: 3.5 - 5.1 mmol/L);  Sodium 134 mmol/L (L; Ref range: 136 - 145 mmol/L)  Recent Labs     10/20/22  0055 10/18/22  1051   WBC 8.4 9.3   HGB 11.0* 12.6   HCT 31.4* 34.6*    234     Recent Labs     10/20/22  1157 10/20/22  0055 10/19/22  0407    139 134*   K 3.7 3.9 4.0    106 104   CO2 26 26 24   BUN 6 5* 5*   CREA 0.73 0.70 0.71   * 82 99   CA 8.8 8.9 9.0   MG  --  2.3  --    PHOS  --  2.1*  --      Recent Labs     10/20/22  1157 10/18/22  1051   * 140*   TP 6.8 7.8   ALB 2.6* 3.2*   GLOB 4.2* 4.6*   LPSE  --  78     No results for input(s): INR, PTP, APTT, INREXT in the last 72 hours. No results for input(s): FE, TIBC, PSAT, FERR in the last 72 hours. No results for input(s): PH, PCO2, PO2 in the last 72 hours. No results for input(s): CPK, CKMB in the last 72 hours. No lab exists for component: TROPONINI  No components found for: [place]    Total time to discharge patient was 31 minutes more than 50% of time was spent for counseling and coordination of care.       Signed:  Jean Claude Gonzalez MD  10/20/2022  4:11 PM

## 2022-10-20 NOTE — CONSULTS
Infectious Disease Consult    Today's Date: 10/20/2022   Admit Date: 10/18/2022    Impression:   Splenic infarct  Adenopathy   Fevers  Positive EBV serology--EBV is rarely associated with splenic infarction    Plan:   Supportive care  Stop antibiotic therapy and monitor  No specific reason to stay in house from ID standpoint    Anti-infectives:   Cefepime     Subjective:   Date of Consultation:  2022  Referring Physician: Dr Yecenia Garcia    Patient is a 25 y.o. female admitted with daily fevers to 102+ and LUQ pain. 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 She is improving since admission and wonders if she can go. She was empirically begun on antibiotic therapy and we are asked to see her in consultation. Patient Active Problem List   Diagnosis Code    Splenic infarction D73.5     No past medical history on file. No family history on file. Social History     Tobacco Use    Smoking status: Not on file    Smokeless tobacco: Not on file   Substance Use Topics    Alcohol use: Not on file     No past surgical history on file. Prior to Admission medications    Not on File       Allergies   Allergen Reactions    Vancomycin Rash        Review of Systems:  A comprehensive review of systems was negative except for that written in the History of Present Illness.     Objective:     Visit Vitals  /76   Pulse 93   Temp 98.2 °F (36.8 °C)   Resp 16   Ht 5' 11\" (1.803 m)   Wt 94.8 kg (208 lb 15.9 oz)   SpO2 99%   BMI 29.15 kg/m²     Temp (24hrs), Av.9 °F (37.2 °C), Min:98.2 °F (36.8 °C), Max:99.5 °F (37.5 °C)       Lines:  Peripheral IV:       Physical Exam:  Lungs:  clear to auscultation bilaterally  Heart:  regular rate and rhythm  Abdomen:  abnormal findings:  tenderness mild in the LUQ  Skin:  no rash or abnormalities    Data Review:     CBC:  Recent Labs     10/20/22  0055 10/18/22  1051   WBC 8.4 9.3   GRANS 25* 38   MONOS 6 6   EOS 0 0   ANEU 2.1 3.5   ABL 5.7* 5.0*   HGB 11.0* 12.6   HCT 31.4* 34.6*    234       BMP:  Recent Labs     10/20/22  1157 10/20/22  0055 10/19/22  0407   CREA 0.73 0.70 0.71   BUN 6 5* 5*    139 134*   K 3.7 3.9 4.0    106 104   CO2 26 26 24   AGAP 6 7 6   * 82 99       LFTS:  Recent Labs     10/20/22  1157 10/18/22  1051   TBILI 2.5* 1.5*   ALT 88* 100*   * 140*   TP 6.8 7.8   ALB 2.6* 3.2*       Microbiology:     All Micro Results       Procedure Component Value Units Date/Time    CULTURE, BLOOD, PAIRED [[id number]] Collected: 10/18/22 1602    Order Status: Completed Specimen: Blood Updated: 10/20/22 0617     Special Requests: NO SPECIAL REQUESTS        Culture result: NO GROWTH 2 DAYS       COVID-19 RAPID TEST [[id number]] Collected: 10/18/22 1602    Order Status: Completed Specimen: Nasopharyngeal Updated: 10/18/22 1640     Specimen source Nasopharyngeal        COVID-19 rapid test Not detected        Comment: Rapid Abbott ID Now       Rapid NAAT:  The specimen is NEGATIVE for SARS-CoV-2, the novel coronavirus associated with COVID-19. Negative results should be treated as presumptive and, if inconsistent with clinical signs and symptoms or necessary for patient management, should be tested with an alternative molecular assay. Negative results do not preclude SARS-CoV-2 infection and should not be used as the sole basis for patient management decisions. This test has been authorized by the FDA under an Emergency Use Authorization (EUA) for use by authorized laboratories. Fact sheet for Healthcare Providers:  http://www.laura-lira.biz/  Fact sheet for Patients: http://www.sanchez-lira.biz/       Methodology: Isothermal Nucleic Acid Amplification         URINE CULTURE HOLD SAMPLE [[id number]] Collected: 10/18/22 1139    Order Status: Completed Specimen: Urine from Serum Updated: 10/18/22 1145     Urine culture hold       Urine on hold in Microbiology dept for 2 days.   If unpreserved urine is submitted, it cannot be used for addtional testing after 24 hours, recollection will be required.                   Imaging:   Reviewed     Signed By: Lopez Sandoval MD     October 20, 2022

## 2022-10-21 LAB
AT III PPP CHRO-ACNC: 99 % (ref 75–135)
PROT C AG PPP IA-ACNC: 71 % (ref 60–150)
PROT C PPP-ACNC: 77 % (ref 73–180)
PROT S AG ACT/NOR PPP IA: 60 % (ref 60–150)
PROT S FREE AG ACT/NOR PPP IA: 65 % (ref 61–136)

## 2022-10-23 LAB
CARDIOLIPIN IGA SER IA-ACNC: 18 APL U/ML (ref 0–11)
CARDIOLIPIN IGG SER IA-ACNC: 9 GPL U/ML (ref 0–14)
CARDIOLIPIN IGM SER IA-ACNC: 20 MPL U/ML (ref 0–12)

## 2022-10-24 LAB
BACTERIA SPEC CULT: NORMAL
CMV IGG SERPL IA-ACNC: >10 U/ML (ref 0–0.59)
CMV IGM SERPL IA-ACNC: <30 AU/ML (ref 0–29.9)
COMMENT, 096657: ABNORMAL
EBV VCA IGG SER-ACNC: 224 U/ML (ref 0–17.9)
EBV VCA IGM SER-ACNC: >160 U/ML (ref 0–35.9)
F5 P.R506Q BLD/T QL: NORMAL
HGB A MFR BLD: 97.5 % (ref 96.4–98.8)
HGB A2 MFR BLD COLUMN CHROM: 2.5 % (ref 1.8–3.2)
HGB F MFR BLD: 0 % (ref 0–2)
HGB FRACT BLD-IMP: NORMAL
HGB S MFR BLD: 0 %
SERVICE CMNT-IMP: NORMAL
T GONDII IGG SERPL IA-ACNC: <3 IU/ML (ref 0–7.1)
T GONDII IGM SER IA-ACNC: <3 AU/ML (ref 0–7.9)

## 2022-10-26 LAB
BACKGROUND, CALR2T: NORMAL
CALR MUTATION DETECTION RESULT, CALR1T: NORMAL
F2 GENE MUT ANL BLD/T: NORMAL
LAB DIRECTOR NAME PROVIDER: NORMAL
REF LAB TEST METHOD: NORMAL
REFERENCES, CALR4T: NORMAL

## 2023-04-26 NOTE — PROGRESS NOTES
Problem: Falls - Risk of  Goal: *Absence of Falls  Description: Document Wadell Beer Fall Risk and appropriate interventions in the flowsheet.   Outcome: Progressing Towards Goal  Note: Fall Risk Interventions:            Medication Interventions: Evaluate medications/consider consulting pharmacy                   Problem: Patient Education: Go to Patient Education Activity  Goal: Patient/Family Education  Outcome: Progressing Towards Goal Bi-Rhombic Flap Text: The defect edges were debeveled with a #15 scalpel blade. Given the location of the defect and the proximity to free margins a bi-rhombic flap was deemed most appropriate. Using a sterile surgical marker, an appropriate rhombic flap was drawn incorporating the defect. The area thus outlined was incised deep to adipose tissue with a #15 scalpel blade. The skin margins were undermined to an appropriate distance in all directions utilizing iris scissors. Following this, the designed flap was carried over into the primary defect and sutured into place.

## 2025-08-12 RX ORDER — BUSPIRONE HYDROCHLORIDE 10 MG/1
10 TABLET ORAL 2 TIMES DAILY
COMMUNITY

## 2025-08-12 RX ORDER — LAMOTRIGINE 100 MG/1
100 TABLET ORAL EVERY MORNING
COMMUNITY

## 2025-08-27 ENCOUNTER — ANESTHESIA EVENT (OUTPATIENT)
Facility: HOSPITAL | Age: 25
End: 2025-08-27
Payer: COMMERCIAL

## 2025-08-28 ENCOUNTER — HOSPITAL ENCOUNTER (OUTPATIENT)
Facility: HOSPITAL | Age: 25
Setting detail: OUTPATIENT SURGERY
Discharge: HOME | End: 2025-08-28
Attending: SURGERY | Admitting: SURGERY
Payer: COMMERCIAL

## 2025-08-28 ENCOUNTER — ANESTHESIA (OUTPATIENT)
Facility: HOSPITAL | Age: 25
End: 2025-08-28
Payer: COMMERCIAL

## 2025-08-28 VITALS
RESPIRATION RATE: 12 BRPM | HEART RATE: 68 BPM | OXYGEN SATURATION: 93 % | TEMPERATURE: 98.1 F | DIASTOLIC BLOOD PRESSURE: 72 MMHG | WEIGHT: 220.46 LBS | HEIGHT: 71 IN | SYSTOLIC BLOOD PRESSURE: 108 MMHG | BODY MASS INDEX: 30.86 KG/M2

## 2025-08-28 LAB — HCG UR QL: NEGATIVE

## 2025-08-28 PROCEDURE — 6360000002 HC RX W HCPCS: Performed by: ANESTHESIOLOGY

## 2025-08-28 PROCEDURE — 7100000010 HC PHASE II RECOVERY - FIRST 15 MIN: Performed by: SURGERY

## 2025-08-28 PROCEDURE — 6360000002 HC RX W HCPCS: Performed by: NURSE ANESTHETIST, CERTIFIED REGISTERED

## 2025-08-28 PROCEDURE — 3700000000 HC ANESTHESIA ATTENDED CARE: Performed by: SURGERY

## 2025-08-28 PROCEDURE — 6370000000 HC RX 637 (ALT 250 FOR IP): Performed by: NURSE ANESTHETIST, CERTIFIED REGISTERED

## 2025-08-28 PROCEDURE — 3600000003 HC SURGERY LEVEL 3 BASE: Performed by: SURGERY

## 2025-08-28 PROCEDURE — 6360000002 HC RX W HCPCS: Performed by: SURGERY

## 2025-08-28 PROCEDURE — 3700000001 HC ADD 15 MINUTES (ANESTHESIA): Performed by: SURGERY

## 2025-08-28 PROCEDURE — 2720000010 HC SURG SUPPLY STERILE: Performed by: SURGERY

## 2025-08-28 PROCEDURE — 81025 URINE PREGNANCY TEST: CPT

## 2025-08-28 PROCEDURE — 2709999900 HC NON-CHARGEABLE SUPPLY: Performed by: SURGERY

## 2025-08-28 PROCEDURE — 7100000001 HC PACU RECOVERY - ADDTL 15 MIN: Performed by: SURGERY

## 2025-08-28 PROCEDURE — 6370000000 HC RX 637 (ALT 250 FOR IP): Performed by: SURGERY

## 2025-08-28 PROCEDURE — 2500000003 HC RX 250 WO HCPCS: Performed by: SURGERY

## 2025-08-28 PROCEDURE — 6370000000 HC RX 637 (ALT 250 FOR IP): Performed by: ANESTHESIOLOGY

## 2025-08-28 PROCEDURE — 2580000003 HC RX 258: Performed by: ANESTHESIOLOGY

## 2025-08-28 PROCEDURE — 2500000003 HC RX 250 WO HCPCS: Performed by: NURSE ANESTHETIST, CERTIFIED REGISTERED

## 2025-08-28 PROCEDURE — 7100000000 HC PACU RECOVERY - FIRST 15 MIN: Performed by: SURGERY

## 2025-08-28 PROCEDURE — 7100000011 HC PHASE II RECOVERY - ADDTL 15 MIN: Performed by: SURGERY

## 2025-08-28 PROCEDURE — L8000 MASTECTOMY BRA: HCPCS | Performed by: SURGERY

## 2025-08-28 PROCEDURE — 3600000013 HC SURGERY LEVEL 3 ADDTL 15MIN: Performed by: SURGERY

## 2025-08-28 PROCEDURE — 2580000003 HC RX 258: Performed by: SURGERY

## 2025-08-28 PROCEDURE — 88305 TISSUE EXAM BY PATHOLOGIST: CPT

## 2025-08-28 RX ORDER — FAMOTIDINE 10 MG/ML
INJECTION, SOLUTION INTRAVENOUS
Status: DISCONTINUED | OUTPATIENT
Start: 2025-08-28 | End: 2025-08-28 | Stop reason: SDUPTHER

## 2025-08-28 RX ORDER — MINERAL OIL AND WHITE PETROLATUM 150; 830 MG/G; MG/G
OINTMENT OPHTHALMIC
Status: DISCONTINUED | OUTPATIENT
Start: 2025-08-28 | End: 2025-08-28 | Stop reason: SDUPTHER

## 2025-08-28 RX ORDER — GLYCOPYRROLATE 0.2 MG/ML
INJECTION INTRAMUSCULAR; INTRAVENOUS
Status: DISCONTINUED | OUTPATIENT
Start: 2025-08-28 | End: 2025-08-28 | Stop reason: SDUPTHER

## 2025-08-28 RX ORDER — FENTANYL CITRATE 50 UG/ML
INJECTION, SOLUTION INTRAMUSCULAR; INTRAVENOUS
Status: DISCONTINUED | OUTPATIENT
Start: 2025-08-28 | End: 2025-08-28 | Stop reason: SDUPTHER

## 2025-08-28 RX ORDER — DIPHENHYDRAMINE HYDROCHLORIDE 50 MG/ML
12.5 INJECTION, SOLUTION INTRAMUSCULAR; INTRAVENOUS
Status: DISCONTINUED | OUTPATIENT
Start: 2025-08-28 | End: 2025-08-28 | Stop reason: HOSPADM

## 2025-08-28 RX ORDER — SUCCINYLCHOLINE CHLORIDE 20 MG/ML
INJECTION INTRAMUSCULAR; INTRAVENOUS
Status: DISCONTINUED | OUTPATIENT
Start: 2025-08-28 | End: 2025-08-28 | Stop reason: SDUPTHER

## 2025-08-28 RX ORDER — HYDROMORPHONE HYDROCHLORIDE 2 MG/ML
INJECTION, SOLUTION INTRAMUSCULAR; INTRAVENOUS; SUBCUTANEOUS
Status: DISCONTINUED | OUTPATIENT
Start: 2025-08-28 | End: 2025-08-28 | Stop reason: SDUPTHER

## 2025-08-28 RX ORDER — SODIUM CHLORIDE, SODIUM LACTATE, POTASSIUM CHLORIDE, CALCIUM CHLORIDE 600; 310; 30; 20 MG/100ML; MG/100ML; MG/100ML; MG/100ML
INJECTION, SOLUTION INTRAVENOUS CONTINUOUS
Status: DISCONTINUED | OUTPATIENT
Start: 2025-08-28 | End: 2025-08-28 | Stop reason: HOSPADM

## 2025-08-28 RX ORDER — DEXMEDETOMIDINE HYDROCHLORIDE 100 UG/ML
INJECTION, SOLUTION INTRAVENOUS
Status: DISCONTINUED | OUTPATIENT
Start: 2025-08-28 | End: 2025-08-28 | Stop reason: SDUPTHER

## 2025-08-28 RX ORDER — ACETAMINOPHEN 500 MG
1000 TABLET ORAL ONCE
Status: COMPLETED | OUTPATIENT
Start: 2025-08-28 | End: 2025-08-28

## 2025-08-28 RX ORDER — PROPOFOL 10 MG/ML
INJECTION, EMULSION INTRAVENOUS
Status: DISCONTINUED | OUTPATIENT
Start: 2025-08-28 | End: 2025-08-28 | Stop reason: SDUPTHER

## 2025-08-28 RX ORDER — MIDAZOLAM HYDROCHLORIDE 2 MG/2ML
2 INJECTION, SOLUTION INTRAMUSCULAR; INTRAVENOUS
Status: COMPLETED | OUTPATIENT
Start: 2025-08-28 | End: 2025-08-28

## 2025-08-28 RX ORDER — DEXAMETHASONE SODIUM PHOSPHATE 4 MG/ML
INJECTION, SOLUTION INTRA-ARTICULAR; INTRALESIONAL; INTRAMUSCULAR; INTRAVENOUS; SOFT TISSUE
Status: DISCONTINUED | OUTPATIENT
Start: 2025-08-28 | End: 2025-08-28 | Stop reason: SDUPTHER

## 2025-08-28 RX ORDER — ONDANSETRON 2 MG/ML
INJECTION INTRAMUSCULAR; INTRAVENOUS
Status: DISCONTINUED | OUTPATIENT
Start: 2025-08-28 | End: 2025-08-28 | Stop reason: SDUPTHER

## 2025-08-28 RX ORDER — ROCURONIUM BROMIDE 10 MG/ML
INJECTION, SOLUTION INTRAVENOUS
Status: DISCONTINUED | OUTPATIENT
Start: 2025-08-28 | End: 2025-08-28 | Stop reason: SDUPTHER

## 2025-08-28 RX ORDER — TRANEXAMIC ACID 100 MG/ML
INJECTION, SOLUTION INTRAVENOUS
Status: DISCONTINUED | OUTPATIENT
Start: 2025-08-28 | End: 2025-08-28 | Stop reason: SDUPTHER

## 2025-08-28 RX ORDER — SCOPOLAMINE 1 MG/3D
1 PATCH, EXTENDED RELEASE TRANSDERMAL
Status: DISCONTINUED | OUTPATIENT
Start: 2025-08-28 | End: 2025-08-28 | Stop reason: HOSPADM

## 2025-08-28 RX ORDER — LIDOCAINE HYDROCHLORIDE 10 MG/ML
1 INJECTION, SOLUTION EPIDURAL; INFILTRATION; INTRACAUDAL; PERINEURAL
Status: DISCONTINUED | OUTPATIENT
Start: 2025-08-28 | End: 2025-08-28 | Stop reason: HOSPADM

## 2025-08-28 RX ORDER — FENTANYL CITRATE 50 UG/ML
100 INJECTION, SOLUTION INTRAMUSCULAR; INTRAVENOUS
Status: DISCONTINUED | OUTPATIENT
Start: 2025-08-28 | End: 2025-08-28 | Stop reason: HOSPADM

## 2025-08-28 RX ORDER — ONDANSETRON 2 MG/ML
4 INJECTION INTRAMUSCULAR; INTRAVENOUS
Status: DISCONTINUED | OUTPATIENT
Start: 2025-08-28 | End: 2025-08-28 | Stop reason: HOSPADM

## 2025-08-28 RX ADMIN — FENTANYL CITRATE 50 MCG: 50 INJECTION, SOLUTION INTRAMUSCULAR; INTRAVENOUS at 13:23

## 2025-08-28 RX ADMIN — ROCURONIUM BROMIDE 40 MG: 10 INJECTION, SOLUTION INTRAVENOUS at 13:34

## 2025-08-28 RX ADMIN — PROPOFOL 200 MCG/KG/MIN: 10 INJECTION, EMULSION INTRAVENOUS at 13:23

## 2025-08-28 RX ADMIN — PROPOFOL 70 MG: 10 INJECTION, EMULSION INTRAVENOUS at 13:56

## 2025-08-28 RX ADMIN — HYDROMORPHONE HYDROCHLORIDE 0.2 MG: 2 INJECTION, SOLUTION INTRAMUSCULAR; INTRAVENOUS; SUBCUTANEOUS at 15:19

## 2025-08-28 RX ADMIN — ROCURONIUM BROMIDE 10 MG: 10 INJECTION, SOLUTION INTRAVENOUS at 14:13

## 2025-08-28 RX ADMIN — LIDOCAINE HYDROCHLORIDE 40 MG: 20 INJECTION, SOLUTION EPIDURAL; INFILTRATION; INTRACAUDAL; PERINEURAL at 13:23

## 2025-08-28 RX ADMIN — HYDROMORPHONE HYDROCHLORIDE 0.4 MG: 2 INJECTION, SOLUTION INTRAMUSCULAR; INTRAVENOUS; SUBCUTANEOUS at 14:42

## 2025-08-28 RX ADMIN — GLYCOPYRROLATE 0.2 MG: 0.2 INJECTION INTRAMUSCULAR; INTRAVENOUS at 13:17

## 2025-08-28 RX ADMIN — FAMOTIDINE 20 MG: 10 INJECTION, SOLUTION INTRAVENOUS at 13:18

## 2025-08-28 RX ADMIN — CEFAZOLIN SODIUM 2000 MG: 1 POWDER, FOR SOLUTION INTRAMUSCULAR; INTRAVENOUS at 13:38

## 2025-08-28 RX ADMIN — PROPOFOL 130 MG: 10 INJECTION, EMULSION INTRAVENOUS at 13:24

## 2025-08-28 RX ADMIN — SUGAMMADEX 200 MG: 100 INJECTION, SOLUTION INTRAVENOUS at 15:01

## 2025-08-28 RX ADMIN — HYDROMORPHONE HYDROCHLORIDE 0.6 MG: 2 INJECTION, SOLUTION INTRAMUSCULAR; INTRAVENOUS; SUBCUTANEOUS at 14:12

## 2025-08-28 RX ADMIN — HYDROMORPHONE HYDROCHLORIDE 0.4 MG: 2 INJECTION, SOLUTION INTRAMUSCULAR; INTRAVENOUS; SUBCUTANEOUS at 14:28

## 2025-08-28 RX ADMIN — MIDAZOLAM HYDROCHLORIDE 2 MG: 1 INJECTION, SOLUTION INTRAMUSCULAR; INTRAVENOUS at 13:21

## 2025-08-28 RX ADMIN — SUCCINYLCHOLINE CHLORIDE 180 MG: 20 INJECTION, SOLUTION INTRAMUSCULAR; INTRAVENOUS at 13:25

## 2025-08-28 RX ADMIN — ACETAMINOPHEN 1000 MG: 500 TABLET ORAL at 11:00

## 2025-08-28 RX ADMIN — ROCURONIUM BROMIDE 10 MG: 10 INJECTION, SOLUTION INTRAVENOUS at 13:24

## 2025-08-28 RX ADMIN — HYDROMORPHONE HYDROCHLORIDE 0.5 MG: 1 INJECTION, SOLUTION INTRAMUSCULAR; INTRAVENOUS; SUBCUTANEOUS at 16:34

## 2025-08-28 RX ADMIN — MIDAZOLAM HYDROCHLORIDE 3 MG: 1 INJECTION, SOLUTION INTRAMUSCULAR; INTRAVENOUS at 13:17

## 2025-08-28 RX ADMIN — SODIUM CHLORIDE, SODIUM LACTATE, POTASSIUM CHLORIDE, AND CALCIUM CHLORIDE: .6; .31; .03; .02 INJECTION, SOLUTION INTRAVENOUS at 11:00

## 2025-08-28 RX ADMIN — PROPOFOL 200 MCG/KG/MIN: 10 INJECTION, EMULSION INTRAVENOUS at 14:04

## 2025-08-28 RX ADMIN — FENTANYL CITRATE 100 MCG: 50 INJECTION, SOLUTION INTRAMUSCULAR; INTRAVENOUS at 13:54

## 2025-08-28 RX ADMIN — DEXAMETHASONE SODIUM PHOSPHATE 4 MG: 4 INJECTION, SOLUTION INTRAMUSCULAR; INTRAVENOUS at 13:28

## 2025-08-28 RX ADMIN — DEXMEDETOMIDINE 4 MCG: 100 INJECTION, SOLUTION INTRAVENOUS at 13:52

## 2025-08-28 RX ADMIN — ROCURONIUM BROMIDE 20 MG: 10 INJECTION, SOLUTION INTRAVENOUS at 13:47

## 2025-08-28 RX ADMIN — MINERAL OIL, WHITE PETROLATUM 1 APPLICATOR: .03; .94 OINTMENT OPHTHALMIC at 13:26

## 2025-08-28 RX ADMIN — DEXMEDETOMIDINE 12 MCG: 100 INJECTION, SOLUTION INTRAVENOUS at 14:52

## 2025-08-28 RX ADMIN — TRANEXAMIC ACID 1000 MG: 100 INJECTION, SOLUTION INTRAVENOUS at 14:28

## 2025-08-28 RX ADMIN — ONDANSETRON 4 MG: 2 INJECTION, SOLUTION INTRAMUSCULAR; INTRAVENOUS at 13:18

## 2025-08-28 RX ADMIN — FENTANYL CITRATE 50 MCG: 50 INJECTION, SOLUTION INTRAMUSCULAR; INTRAVENOUS at 13:28

## 2025-08-28 RX ADMIN — HYDROMORPHONE HYDROCHLORIDE 0.4 MG: 2 INJECTION, SOLUTION INTRAMUSCULAR; INTRAVENOUS; SUBCUTANEOUS at 14:36

## 2025-08-28 RX ADMIN — DEXMEDETOMIDINE 4 MCG: 100 INJECTION, SOLUTION INTRAVENOUS at 13:50

## 2025-08-28 RX ADMIN — DEXMEDETOMIDINE 4 MCG: 100 INJECTION, SOLUTION INTRAVENOUS at 13:54

## 2025-08-28 RX ADMIN — FENTANYL CITRATE 50 MCG: 50 INJECTION, SOLUTION INTRAMUSCULAR; INTRAVENOUS at 13:51

## 2025-08-28 RX ADMIN — PROPOFOL 50 MG: 10 INJECTION, EMULSION INTRAVENOUS at 14:12

## 2025-08-28 ASSESSMENT — PAIN DESCRIPTION - ORIENTATION: ORIENTATION: LEFT;RIGHT

## 2025-08-28 ASSESSMENT — PAIN - FUNCTIONAL ASSESSMENT
PAIN_FUNCTIONAL_ASSESSMENT: 0-10
PAIN_FUNCTIONAL_ASSESSMENT: ACTIVITIES ARE NOT PREVENTED
PAIN_FUNCTIONAL_ASSESSMENT: 0-10

## 2025-08-28 ASSESSMENT — ENCOUNTER SYMPTOMS
GASTROINTESTINAL NEGATIVE: 1
RESPIRATORY NEGATIVE: 1

## 2025-08-28 ASSESSMENT — PAIN DESCRIPTION - LOCATION: LOCATION: BREAST

## 2025-08-28 ASSESSMENT — PAIN SCALES - GENERAL
PAINLEVEL_OUTOF10: 4
PAINLEVEL_OUTOF10: 0

## 2025-08-28 ASSESSMENT — PAIN DESCRIPTION - DESCRIPTORS: DESCRIPTORS: ACHING;SORE

## (undated) DEVICE — SUTURE MONOCRYL 2-0 SH 27IN UND PLUS MCP417

## (undated) DEVICE — SUTURE PROL SZ 0 L30IN NONABSORBABLE BLU L36MM CT-1 1/2 CIR 8424H

## (undated) DEVICE — SUTURE ETHILON SZ 3-0 L18IN NONABSORBABLE BLK PS-2 L19MM 3/8 1669H

## (undated) DEVICE — SUTURE MONOCRYL + SZ 3-0 L27IN ABSRB UD L26MM SH 1/2 CIR MCP416H

## (undated) DEVICE — STRIP SKIN CLSR W0.5XL4IN WHT SPUNBOUND FBR NYL STERIL HI ADH

## (undated) DEVICE — DRAPE FLD WRM W44XL66IN C6L FOR INTRATEMP SYS THERMABASIN

## (undated) DEVICE — TUBING SCTION CONN 1/4X10 RIB

## (undated) DEVICE — Device

## (undated) DEVICE — SPONGE LAP W18XL18IN WHT COT 4 PLY FLD STRUNG RADPQ DISP ST 2 PER PACK

## (undated) DEVICE — SUTURE MONOCRYL SZ 3-0 L27IN ABSRB UD L19MM PS-2 3/8 CIR PRIM Y427H

## (undated) DEVICE — SPONGE GZ W4XL4IN COT 12 PLY TYP VII WVN C FLD DSGN STERILE

## (undated) DEVICE — DRESSING FOAM DISK DIA1IN H 7MM HYDRPHLC CHG IMPREG IN SL

## (undated) DEVICE — CANISTER SUCT 3000CC RIG LOK SNAP ON LID W/ STD EL SUCT PRT

## (undated) DEVICE — SOLUTION IRRIG 1000ML H2O PIC PLAS SHATTERPROOF CONTAINER

## (undated) DEVICE — ELECTRODE PT RET AD L9FT HI MOIST COND ADH HYDRGEL CORDED

## (undated) DEVICE — DRAIN SURG 19FR L025IN DIA63MM SIL CHN RND FULL FLUT CLS

## (undated) DEVICE — SOLUTION IV AD PED 1000ML LAC R INJ USP CONTAINER EXCEL

## (undated) DEVICE — TRAY SKIN SCRUB W/4 COMPARTMENT

## (undated) DEVICE — TUBING SUCT L9FT FOR AUTOFUSE INFLTR SYS

## (undated) DEVICE — GLOVE SURG SZ 65 THK91MIL LTX FREE SYN POLYISOPRENE

## (undated) DEVICE — EVACUATOR SMOKE L 10 FT SMOKE MANAGEMENT EXTENDED EDGE ELECTRODE STERILE DISP

## (undated) DEVICE — SOLUTION SCRB 2OZ 10% POVIDONE IOD ANTIMIC BTL

## (undated) DEVICE — GLOVE ORANGE PI 7 1/2   MSG9075

## (undated) DEVICE — SUTURE MONOCRYL SZ 2-0 L27IN ABSRB UD SH L26MM TAPERPOINT NDL Y417H

## (undated) DEVICE — DRESSING TRNSPAR W4XL4.75IN STD FRME STYL WTRPRF BARR

## (undated) DEVICE — SOLUTION IV SODIUM CHL 0.9% 100 ML INJ FLX CONTAINER

## (undated) DEVICE — SOLUTION IRRIG 1000ML 09% SOD CHL USP PIC PLAS CONTAINER

## (undated) DEVICE — STAPLER SKIN H3.9MM WIRE DIA0.58MM CRWN 6.9MM 35 STPL ROT

## (undated) DEVICE — BRA SURG LG 40-42IN WHT LYCRA FR HK AND LOOP CLSR WIDE ADJ

## (undated) DEVICE — SUTURE VICRYL SZ 0 L27IN ABSRB UD SH L26MM 1/2 CIR TAPERPOINT J418H

## (undated) DEVICE — RESERVOIR SUCT 100CC SIL BLB EVAC W/O TRCR FOR CLS WND